# Patient Record
Sex: FEMALE | Race: WHITE | NOT HISPANIC OR LATINO | URBAN - METROPOLITAN AREA
[De-identification: names, ages, dates, MRNs, and addresses within clinical notes are randomized per-mention and may not be internally consistent; named-entity substitution may affect disease eponyms.]

---

## 2021-03-23 ENCOUNTER — IMMUNIZATION (OUTPATIENT)
Dept: NURSING | Facility: CLINIC | Age: 26
End: 2021-03-23
Payer: COMMERCIAL

## 2021-03-23 PROCEDURE — 0001A PR COVID VAC PFIZER DIL RECON 30 MCG/0.3 ML IM: CPT

## 2021-03-23 PROCEDURE — 91300 PR COVID VAC PFIZER DIL RECON 30 MCG/0.3 ML IM: CPT

## 2021-04-13 ENCOUNTER — IMMUNIZATION (OUTPATIENT)
Dept: NURSING | Facility: CLINIC | Age: 26
End: 2021-04-13
Attending: INTERNAL MEDICINE
Payer: COMMERCIAL

## 2021-04-13 PROCEDURE — 91300 PR COVID VAC PFIZER DIL RECON 30 MCG/0.3 ML IM: CPT

## 2021-04-13 PROCEDURE — 0002A PR COVID VAC PFIZER DIL RECON 30 MCG/0.3 ML IM: CPT

## 2021-05-08 ENCOUNTER — HEALTH MAINTENANCE LETTER (OUTPATIENT)
Age: 26
End: 2021-05-08

## 2021-10-23 ENCOUNTER — HEALTH MAINTENANCE LETTER (OUTPATIENT)
Age: 26
End: 2021-10-23

## 2022-06-04 ENCOUNTER — HEALTH MAINTENANCE LETTER (OUTPATIENT)
Age: 27
End: 2022-06-04

## 2022-10-10 ENCOUNTER — HEALTH MAINTENANCE LETTER (OUTPATIENT)
Age: 27
End: 2022-10-10

## 2023-06-10 ENCOUNTER — HEALTH MAINTENANCE LETTER (OUTPATIENT)
Age: 28
End: 2023-06-10

## 2024-05-08 ENCOUNTER — OFFICE VISIT (OUTPATIENT)
Dept: FAMILY MEDICINE | Facility: CLINIC | Age: 29
End: 2024-05-08
Payer: COMMERCIAL

## 2024-05-08 VITALS
OXYGEN SATURATION: 97 % | RESPIRATION RATE: 22 BRPM | BODY MASS INDEX: 50.5 KG/M2 | HEIGHT: 63 IN | DIASTOLIC BLOOD PRESSURE: 85 MMHG | SYSTOLIC BLOOD PRESSURE: 134 MMHG | TEMPERATURE: 97.5 F | WEIGHT: 285 LBS | HEART RATE: 125 BPM

## 2024-05-08 DIAGNOSIS — Z13.220 LIPID SCREENING: ICD-10-CM

## 2024-05-08 DIAGNOSIS — L65.9 HAIR LOSS: ICD-10-CM

## 2024-05-08 DIAGNOSIS — L68.0 HIRSUTISM: ICD-10-CM

## 2024-05-08 DIAGNOSIS — N94.6 DYSMENORRHEA: ICD-10-CM

## 2024-05-08 DIAGNOSIS — R53.83 OTHER FATIGUE: ICD-10-CM

## 2024-05-08 DIAGNOSIS — R06.83 SNORING: Primary | ICD-10-CM

## 2024-05-08 DIAGNOSIS — Z13.1 SCREENING FOR DIABETES MELLITUS: ICD-10-CM

## 2024-05-08 DIAGNOSIS — L70.9 ACNE, UNSPECIFIED ACNE TYPE: ICD-10-CM

## 2024-05-08 PROBLEM — F32.81 PMDD (PREMENSTRUAL DYSPHORIC DISORDER): Status: ACTIVE | Noted: 2024-05-08

## 2024-05-08 PROBLEM — F41.9 ANXIETY: Status: ACTIVE | Noted: 2024-05-08

## 2024-05-08 LAB
ERYTHROCYTE [DISTWIDTH] IN BLOOD BY AUTOMATED COUNT: 13.4 % (ref 10–15)
HBA1C MFR BLD: 5.8 % (ref 0–5.6)
HCT VFR BLD AUTO: 39.9 % (ref 35–47)
HGB BLD-MCNC: 13.2 G/DL (ref 11.7–15.7)
MCH RBC QN AUTO: 27.5 PG (ref 26.5–33)
MCHC RBC AUTO-ENTMCNC: 33.1 G/DL (ref 31.5–36.5)
MCV RBC AUTO: 83 FL (ref 78–100)
PLATELET # BLD AUTO: 291 10E3/UL (ref 150–450)
RBC # BLD AUTO: 4.8 10E6/UL (ref 3.8–5.2)
WBC # BLD AUTO: 8.3 10E3/UL (ref 4–11)

## 2024-05-08 PROCEDURE — 84443 ASSAY THYROID STIM HORMONE: CPT | Performed by: FAMILY MEDICINE

## 2024-05-08 PROCEDURE — 80053 COMPREHEN METABOLIC PANEL: CPT | Performed by: FAMILY MEDICINE

## 2024-05-08 PROCEDURE — 84270 ASSAY OF SEX HORMONE GLOBUL: CPT | Performed by: FAMILY MEDICINE

## 2024-05-08 PROCEDURE — 83036 HEMOGLOBIN GLYCOSYLATED A1C: CPT | Performed by: FAMILY MEDICINE

## 2024-05-08 PROCEDURE — 84403 ASSAY OF TOTAL TESTOSTERONE: CPT | Performed by: FAMILY MEDICINE

## 2024-05-08 PROCEDURE — 99204 OFFICE O/P NEW MOD 45 MIN: CPT | Performed by: FAMILY MEDICINE

## 2024-05-08 PROCEDURE — 83550 IRON BINDING TEST: CPT | Performed by: FAMILY MEDICINE

## 2024-05-08 PROCEDURE — 85027 COMPLETE CBC AUTOMATED: CPT | Performed by: FAMILY MEDICINE

## 2024-05-08 PROCEDURE — 80061 LIPID PANEL: CPT | Performed by: FAMILY MEDICINE

## 2024-05-08 PROCEDURE — 83540 ASSAY OF IRON: CPT | Performed by: FAMILY MEDICINE

## 2024-05-08 PROCEDURE — 82728 ASSAY OF FERRITIN: CPT | Performed by: FAMILY MEDICINE

## 2024-05-08 PROCEDURE — 82306 VITAMIN D 25 HYDROXY: CPT | Performed by: FAMILY MEDICINE

## 2024-05-08 PROCEDURE — 36415 COLL VENOUS BLD VENIPUNCTURE: CPT | Performed by: FAMILY MEDICINE

## 2024-05-08 RX ORDER — VILAZODONE HYDROCHLORIDE 40 MG/1
40 TABLET ORAL DAILY
COMMUNITY

## 2024-05-08 RX ORDER — VILAZODONE HYDROCHLORIDE 20 MG/1
20 TABLET ORAL DAILY
COMMUNITY

## 2024-05-08 RX ORDER — LIOTHYRONINE SODIUM 25 UG/1
25 TABLET ORAL DAILY
COMMUNITY

## 2024-05-08 NOTE — PROGRESS NOTES
"  Assessment & Plan     Snoring  - BANG score 3   - Adult Sleep Eval & Management  Referral; Future    Dysmenorrhea  - US Pelvic Complete with Transvaginal; Future    Acne, unspecified acne type  Hair loss  Hirsutism  - Pt concerned about PCOS, ordered below and US for further evaluation   - Testosterone Free and Total; Future  - Testosterone Free and Total    Other fatigue  - unclear etiology  - evaluate for anemia vs HAYDEN vs vitamin D deficiency vs thyroid etiology vs other   - ordered below for further evaluation; tx as indicated   - Vitamin D Deficiency; Future  - CBC with platelets; Future  - Comprehensive metabolic panel (BMP + Alb, Alk Phos, ALT, AST, Total. Bili, TP); Future  - TSH with free T4 reflex; Future  - Vitamin D Deficiency  - CBC with platelets  - Comprehensive metabolic panel (BMP + Alb, Alk Phos, ALT, AST, Total. Bili, TP)  - TSH with free T4 reflex    Screening for diabetes mellitus  - Hemoglobin A1c; Future  - Iron and iron binding capacity; Future  - Ferritin; Future  - Hemoglobin A1c  - Iron and iron binding capacity  - Ferritin    Lipid screening  - Lipid panel reflex to direct LDL Non-fasting; Future  - Lipid panel reflex to direct LDL Non-fasting            BMI  Estimated body mass index is 50.25 kg/m  as calculated from the following:    Height as of this encounter: 1.604 m (5' 3.15\").    Weight as of this encounter: 129.3 kg (285 lb).               Lesley Valentine is a 29 year old, presenting for the following health issues:  Consult      5/8/2024    11:17 AM   Additional Questions   Roomed by JENNA   Accompanied by self     History of Present Illness       Reason for visit:  Possible symptoms of PCOS, looking into weightloss medication for insulin resistance, ect. (Maybe look at sinuses if we have time).  Symptom onset:  More than a month  Symptoms include:  Weight gain, hirsutism, losing hair along part, exhaustion, insulin resistance, major PMDD - Can't breathe out of my " nose most of the time, horrible post-nasal drip, bad taste in mouth, blocked ears/tubes, tonsil stones, sneezing, swollen turbinates  Symptom intensity:  Moderate  Symptom progression:  Worsening  Had these symptoms before:  Yes  Has tried/received treatment for these symptoms:  No  What makes it worse:  Staying indoors and not doing anything, getting my period, lying down  What makes it better:  Getting outside, not having stress, ending my period, Flonase    She eats 2-3 servings of fruits and vegetables daily.She consumes 1 sweetened beverage(s) daily.She exercises with enough effort to increase her heart rate 9 or less minutes per day.  She exercises with enough effort to increase her heart rate 3 or less days per week.   She is taking medications regularly.     Works as a bee researcher.     She is concerned about hormonal changes. She has always struggled with weight since puberty and weight gain has gotten worse. Few years ago was on abilify and gained 30-50 lbs in one year but did go off that. With birth control and antidepressants has gained weight. She has suspected that she has PCOS. She had ovarian cyst when shew as younger. She has hair loss, acne and hair on her chest. She is on nexplanon and still gets periods which are unbearable.     She is currently seeing a psychiatrist.    She has never been on weight loss medications.   She has severe PMDD - feels suicidal, cries and horrible cramping. She passes huge blood clots. This is her second nexplanon, even with her first one she would get her menstrual cycles. Periods are heavy but short.     She takes cytomel to augment viibryd. No thyroid etiology.     She snores loudly and not sure about apnea.     She has severe social anxiety and gets racing heart when at medical office or outside. Normal heart rate at home and work.           Objective    LMP 04/29/2024   There is no height or weight on file to calculate BMI.  Physical Exam               Signed  Electronically by: Vaishali Benedict MD

## 2024-05-09 LAB
ALBUMIN SERPL BCG-MCNC: 4.3 G/DL (ref 3.5–5.2)
ALP SERPL-CCNC: 76 U/L (ref 40–150)
ALT SERPL W P-5'-P-CCNC: 9 U/L (ref 0–50)
ANION GAP SERPL CALCULATED.3IONS-SCNC: 10 MMOL/L (ref 7–15)
AST SERPL W P-5'-P-CCNC: 15 U/L (ref 0–45)
BILIRUB SERPL-MCNC: 0.4 MG/DL
BUN SERPL-MCNC: 11.4 MG/DL (ref 6–20)
CALCIUM SERPL-MCNC: 9.5 MG/DL (ref 8.6–10)
CHLORIDE SERPL-SCNC: 104 MMOL/L (ref 98–107)
CHOLEST SERPL-MCNC: 220 MG/DL
CREAT SERPL-MCNC: 0.67 MG/DL (ref 0.51–0.95)
DEPRECATED HCO3 PLAS-SCNC: 24 MMOL/L (ref 22–29)
EGFRCR SERPLBLD CKD-EPI 2021: >90 ML/MIN/1.73M2
FASTING STATUS PATIENT QL REPORTED: NO
FASTING STATUS PATIENT QL REPORTED: NO
FERRITIN SERPL-MCNC: 49 NG/ML (ref 6–175)
GLUCOSE SERPL-MCNC: 154 MG/DL (ref 70–99)
HDLC SERPL-MCNC: 50 MG/DL
IRON BINDING CAPACITY (ROCHE): 292 UG/DL (ref 240–430)
IRON SATN MFR SERPL: 20 % (ref 15–46)
IRON SERPL-MCNC: 59 UG/DL (ref 37–145)
LDLC SERPL CALC-MCNC: 132 MG/DL
NONHDLC SERPL-MCNC: 170 MG/DL
POTASSIUM SERPL-SCNC: 4.1 MMOL/L (ref 3.4–5.3)
PROT SERPL-MCNC: 7.6 G/DL (ref 6.4–8.3)
SHBG SERPL-SCNC: 26 NMOL/L (ref 30–135)
SODIUM SERPL-SCNC: 138 MMOL/L (ref 135–145)
TRIGL SERPL-MCNC: 188 MG/DL
TSH SERPL DL<=0.005 MIU/L-ACNC: 0.57 UIU/ML (ref 0.3–4.2)
VIT D+METAB SERPL-MCNC: 24 NG/ML (ref 20–50)

## 2024-05-10 LAB
TESTOST FREE SERPL-MCNC: 0.68 NG/DL
TESTOST SERPL-MCNC: 33 NG/DL (ref 8–60)

## 2024-05-28 ASSESSMENT — PATIENT HEALTH QUESTIONNAIRE - PHQ9
10. IF YOU CHECKED OFF ANY PROBLEMS, HOW DIFFICULT HAVE THESE PROBLEMS MADE IT FOR YOU TO DO YOUR WORK, TAKE CARE OF THINGS AT HOME, OR GET ALONG WITH OTHER PEOPLE: SOMEWHAT DIFFICULT
SUM OF ALL RESPONSES TO PHQ QUESTIONS 1-9: 7
SUM OF ALL RESPONSES TO PHQ QUESTIONS 1-9: 7

## 2024-05-29 ENCOUNTER — OFFICE VISIT (OUTPATIENT)
Dept: FAMILY MEDICINE | Facility: CLINIC | Age: 29
End: 2024-05-29
Payer: COMMERCIAL

## 2024-05-29 VITALS
DIASTOLIC BLOOD PRESSURE: 82 MMHG | WEIGHT: 284.7 LBS | SYSTOLIC BLOOD PRESSURE: 126 MMHG | HEART RATE: 101 BPM | OXYGEN SATURATION: 98 % | BODY MASS INDEX: 50.45 KG/M2 | RESPIRATION RATE: 20 BRPM | HEIGHT: 63 IN | TEMPERATURE: 97.2 F

## 2024-05-29 DIAGNOSIS — E66.813 CLASS 3 SEVERE OBESITY WITH SERIOUS COMORBIDITY AND BODY MASS INDEX (BMI) OF 50.0 TO 59.9 IN ADULT, UNSPECIFIED OBESITY TYPE (H): ICD-10-CM

## 2024-05-29 DIAGNOSIS — R89.9 ABNORMAL LABORATORY TEST RESULT: ICD-10-CM

## 2024-05-29 DIAGNOSIS — E55.9 AVITAMINOSIS D: Primary | ICD-10-CM

## 2024-05-29 DIAGNOSIS — R73.03 PREDIABETES: ICD-10-CM

## 2024-05-29 DIAGNOSIS — J34.2 DEVIATED NASAL SEPTUM: ICD-10-CM

## 2024-05-29 DIAGNOSIS — E66.01 CLASS 3 SEVERE OBESITY WITH SERIOUS COMORBIDITY AND BODY MASS INDEX (BMI) OF 50.0 TO 59.9 IN ADULT, UNSPECIFIED OBESITY TYPE (H): ICD-10-CM

## 2024-05-29 DIAGNOSIS — Z87.09 HISTORY OF ENLARGED TONSILS: ICD-10-CM

## 2024-05-29 PROCEDURE — 99214 OFFICE O/P EST MOD 30 MIN: CPT | Performed by: FAMILY MEDICINE

## 2024-05-29 RX ORDER — ERGOCALCIFEROL 1.25 MG/1
50000 CAPSULE, LIQUID FILLED ORAL WEEKLY
Qty: 12 CAPSULE | Refills: 0 | Status: SHIPPED | OUTPATIENT
Start: 2024-05-29 | End: 2024-08-15

## 2024-05-29 NOTE — PROGRESS NOTES
"  Assessment & Plan     Avitaminosis D  - advised to start below, recheck  labs in three months   - vitamin D2 (ERGOCALCIFEROL) 13243 units (1250 mcg) capsule; Take 1 capsule (50,000 Units) by mouth once a week for 12 doses  - PRIMARY CARE FOLLOW-UP SCHEDULING; Future    Class 3 severe obesity with serious comorbidity and body mass index (BMI) of 50.0 to 59.9 in adult, unspecified obesity type (H)  - Med Therapy Management Referral    Prediabetes  - discussed lifestyle changes, follow up in three months   - PRIMARY CARE FOLLOW-UP SCHEDULING; Future    Deviated nasal septum  - Adult ENT  Referral; Future    History of enlarged tonsils  - Adult ENT  Referral; Future    Abnormal laboratory test result  - discussed low sex hormone binding globulin, normal testosterone levels. Discussed lifestyle changes and recheck labs in three months. Pelvic US scheduled.           BMI  Estimated body mass index is 50.19 kg/m  as calculated from the following:    Height as of this encounter: 1.604 m (5' 3.15\").    Weight as of this encounter: 129.1 kg (284 lb 11.2 oz).         Patient Instructions   Iron - increasing iron rich foods including green leafy vegetables, beans and lentils    Cholesterol - low fat diet   Here are some ways to improve your nutrition (adapted from the American Academy of Family [employer] handout):  Eat less fat (especially butter, Crisco and other saturated fats)  Buy lean cuts of meat; reduce your portions of red meat or substitute poultry or fish  Use skim milk and low-fat dairy products  Eat no more than 4 egg yolks per week  Avoid fried or fast foods that are high in fat  Eat more fruits and vegetables      Vitamin D - vitamin D 50, 000 U once per week for twelve weeks    Prediabetes - low carbohydrate diet     Allergies - you can try over the counter allegra, Claritin or zyrtec. Continue Flonase.       Lesley Valentine is a 29 year old, presenting for the following health " issues:  RECHECK      5/29/2024     2:03 PM   Additional Questions   Roomed by MILTON MUKHERJEE     History of Present Illness       Reason for visit:  Follow up about general exam on 5/8/24. Weight and sinuses need discussing.    She eats 2-3 servings of fruits and vegetables daily.She consumes 1 sweetened beverage(s) daily.She exercises with enough effort to increase her heart rate 9 or less minutes per day.  She exercises with enough effort to increase her heart rate 3 or less days per week.   She is taking medications regularly.       Overweight since age 6. She went through puberty at that age. She was also on antidepressants. Over the years she has tried exercise and diet. It takes so much for her to loose even a few pounds. Around 2020 she was put on Abilify, once she started taking abilify she put on 50-60 lbs over the course of one year. Since then she hasn't been able to loose weight. She doesn't feel that she eats unhealthy at all. She loves vegetables and tofu. She does like healthy food. She has been on birth control since 2017 and even harder to loose weight. She has not been seen by weight loss clinic or prescribed weight loss medications.     She has always had problems with sinuses. She has had ear tubes and adenoids removed. Over the past few years she has felt that her sinuses and allergies have gotten really bad. She always has post nasal drip and worse at night when laying down. Sometimes she feels that she is chocking on it when she sleeps. She gets horrible tonsil stones, foods can get stuck in tonsils and enlarged tonsils. Eustachian tube always feel blocked. Some pressure in palate. She has a hard time breathing through her nose a lot. No ENT evaluation since she was 4 years old. She is currently taking Flonase at night. Allergies symptom include itchy eyes, nasal discharge, post nasal drip and white coating in tongue. OTC allergy medication hasn't helped.           Objective    /82 (BP  "Location: Right arm, Patient Position: Sitting, Cuff Size: Adult Large)   Pulse 101   Temp 97.2  F (36.2  C) (Temporal)   Resp 20   Ht 1.604 m (5' 3.15\")   Wt 129.1 kg (284 lb 11.2 oz)   LMP 04/29/2024   SpO2 98%   BMI 50.19 kg/m    Body mass index is 50.19 kg/m .  Physical Exam               Signed Electronically by: Vaishali Benedict MD    "

## 2024-05-29 NOTE — PATIENT INSTRUCTIONS
Iron - increasing iron rich foods including green leafy vegetables, beans and lentils    Cholesterol - low fat diet   Here are some ways to improve your nutrition (adapted from the American Academy of Family Practice handout):  Eat less fat (especially butter, Crisco and other saturated fats)  Buy lean cuts of meat; reduce your portions of red meat or substitute poultry or fish  Use skim milk and low-fat dairy products  Eat no more than 4 egg yolks per week  Avoid fried or fast foods that are high in fat  Eat more fruits and vegetables      Vitamin D - vitamin D 50, 000 U once per week for twelve weeks    Prediabetes - low carbohydrate diet     Allergies - you can try over the counter allegra, Claritin or zyrtec. Continue Flonase.

## 2024-06-23 NOTE — PROGRESS NOTES
Medication Therapy Management (MTM) Encounter    ASSESSMENT:                            Medication Adherence/Access: No issues identified    Class III Obesity (BMI 40 or more):   Patient would benefit from  :start wegovy 0.25mg./week dose -- mtm will submit PA today   and lifestyle modifications. See plan for details.       PLAN:                            1. Clinic weight today 289.6lbs--lets try for prior authorization for Wegovy 0.25mg,./week -- watch your cell phone for yes/no answer- message me via [employer] when you get an answer.     Remember potential side effects are nausea/vomiting --only eat when hungry , eat protein first then a little carbohydrate , stop eating when feeling full, if constipation occurs --drink glass of miralax /day . Let me know if your need an anti-nausea or anti-constipation medication.     Walk daily for 30-60 minutes for exercise if you can .         Follow-up: Return in about 29 days (around 7/23/2024), or @ 3 PM, for Medication Therapy Management Visit, Weight loss.    SUBJECTIVE/OBJECTIVE:                          Meme Gramajo is a 29 year old female seen for an initial visit. She was referred to me from Dr. Vaishali Benedict.  .      Reason for visit:   weight managment     Allergies/ADRs: Reviewed in chart  Past Medical History: Reviewed in chart  Tobacco: She reports that she has never smoked. She has never used smokeless tobacco.  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   Personal Healthcare Goals: wt.loss goal = lose weight and fell better , more active.   Activity: not hardly at all--tiny dog --2 blocks.       Medication Adherence/Access: no issues reported    Weight Management   Never tried any wt.loss meds.     Nutrition/Eating Habits: tried dieting in the past many times --nothing has ever worked.  Bfast- coffee and oat milk, cereal or yogurt or bagels.  "  Lunch ; small meals -pea pods, dried fruit , eggs   Dinner; cooks at home - tonight pasta with shrimp and garlic bread  Late night snacks --loves ice cream --1/2 cup after dinner 3 x week.      Exercise/Activity: sedentary   Medication History:  None.      Wt Readings from Last 4 Encounters:   06/24/24 289 lb 9.6 oz (131.4 kg)   05/29/24 284 lb 11.2 oz (129.1 kg)   05/08/24 285 lb (129.3 kg)     Estimated body mass index is 51.06 kg/m  as calculated from the following:    Height as of 5/29/24: 5' 3.15\" (1.604 m).    Weight as of this encounter: 289 lb 9.6 oz (131.4 kg).    Lab Results   Component Value Date    A1C 5.8 05/08/2024       Today's Vitals: BP (!) 126/90   Pulse 101   Wt 289 lb 9.6 oz (131.4 kg)   LMP 04/29/2024   SpO2 97%   BMI 51.06 kg/m    ----------------      I spent 30 minutes with this patient today. All changes were made via collaborative practice agreement with Physician No Ref-Primary. A copy of the visit note was provided to the patient's provider(s).    A summary of these recommendations was given to the patient.    Aramis Espinoza tesfaye.  Medication Therapy Management Provider  [phone]         Medication Therapy Recommendations  Morbid obesity (H)    Rationale: Untreated condition - Needs additional medication therapy - Indication   Recommendation: Start Medication - Wegovy 0.25 MG/0.5ML Soaj   Status: Accepted per CPA   Note: PA submitted today will await ins. reply.              "

## 2024-06-24 ENCOUNTER — OFFICE VISIT (OUTPATIENT)
Dept: PHARMACY | Facility: CLINIC | Age: 29
End: 2024-06-24
Attending: FAMILY MEDICINE
Payer: COMMERCIAL

## 2024-06-24 VITALS
SYSTOLIC BLOOD PRESSURE: 126 MMHG | WEIGHT: 289.6 LBS | DIASTOLIC BLOOD PRESSURE: 90 MMHG | BODY MASS INDEX: 51.06 KG/M2 | HEART RATE: 101 BPM | OXYGEN SATURATION: 97 %

## 2024-06-24 DIAGNOSIS — E66.01 MORBID OBESITY (H): Primary | ICD-10-CM

## 2024-06-24 PROCEDURE — 99605 MTMS BY PHARM NP 15 MIN: CPT | Performed by: PHARMACIST

## 2024-06-24 PROCEDURE — 99607 MTMS BY PHARM ADDL 15 MIN: CPT | Performed by: PHARMACIST

## 2024-06-24 NOTE — Clinical Note
Vaishali--darrylx for mtm referral--I submitted pa today for wegovy --lets see what happens ...-Aramis

## 2024-06-24 NOTE — PATIENT INSTRUCTIONS
"Recommendations from today's MTM visit:                                                    MTM (medication therapy management) is a service provided by a clinical pharmacist designed to help you get the most of out of your medicines.   Today we reviewed what your medicines are for, how to know if they are working, that your medicines are safe and how to make your medicine regimen as easy as possible.      1. Clinic weight today 289.6lbs--lets try for prior authorization for Wegovy 0.25mg,./week -- watch your cell phone for yes/no answer- message me via [employer] when you get an answer.     Remember potential side effects are nausea/vomiting --only eat when hungry , eat protein first then a little carbohydrate , stop eating when feeling full, if constipation occurs --drink glass of miralax /day . Let me know if your need an anti-nausea or anti-constipation medication.     Walk daily for 30-60 minutes for exercise if you can .         Follow-up: Return in about 29 days (around 7/23/2024), or @ 3 PM, for Medication Therapy Management Visit, Weight loss.    It was great speaking with you today.  I value your experience and would be very thankful for your time in providing feedback in our clinic survey. In the next few days, you may receive an email or text message from [employer] with a link to a survey related to your  clinical pharmacist.\"     To schedule another MTM appointment, please call the clinic directly or you may call the MTM scheduling line at [phone] or toll-free at [phone].     My Clinical Pharmacist's contact information:                                                      Please feel free to contact me with any questions or concerns you have.      Aramis Espinoza Rph.  Medication Therapy Management Provider  [phone]    "

## 2024-08-03 ENCOUNTER — HEALTH MAINTENANCE LETTER (OUTPATIENT)
Age: 29
End: 2024-08-03

## 2024-08-05 ENCOUNTER — OFFICE VISIT (OUTPATIENT)
Dept: PHARMACY | Facility: CLINIC | Age: 29
End: 2024-08-05
Payer: COMMERCIAL

## 2024-08-05 VITALS
BODY MASS INDEX: 49.91 KG/M2 | WEIGHT: 283.1 LBS | HEART RATE: 115 BPM | OXYGEN SATURATION: 96 % | DIASTOLIC BLOOD PRESSURE: 86 MMHG | SYSTOLIC BLOOD PRESSURE: 134 MMHG

## 2024-08-05 DIAGNOSIS — R11.0 NAUSEA: ICD-10-CM

## 2024-08-05 DIAGNOSIS — E66.01 MORBID OBESITY (H): Primary | ICD-10-CM

## 2024-08-05 PROCEDURE — 99606 MTMS BY PHARM EST 15 MIN: CPT | Performed by: PHARMACIST

## 2024-08-05 PROCEDURE — 99607 MTMS BY PHARM ADDL 15 MIN: CPT | Performed by: PHARMACIST

## 2024-08-05 RX ORDER — ONDANSETRON 4 MG/1
4 TABLET, FILM COATED ORAL EVERY 8 HOURS PRN
Qty: 30 TABLET | Refills: 1 | Status: SHIPPED | OUTPATIENT
Start: 2024-08-05

## 2024-08-05 NOTE — PROGRESS NOTES
Medication Therapy Management (MTM) Encounter    ASSESSMENT:                            Medication Adherence/Access: No issues identified    Class III Obesity (BMI 40 or more):   Patient would benefit from  :Increase weekly dose Wegovy 1.0mg./week dose   and lifestyle modifications. See plan for details.       PLAN:                            1. Clinic weight - 283.1lbs-- down from 289.6lbs on 6-24-24--wegovy working well- lets increase your weekly dose after you finish last 2 doses of 0.5mg. --then try the 1mg./week wegovy .       Remember potential side effects are nausea/vomiting --only eat when hungry , eat protein first then a little carbohydrate , stop eating when feeling full, if constipation occurs --drink glass of miralax /day . Let me know if your need an anti-nausea or anti-constipation medication.     Walk daily for 30-60 minutes for exercise if you can .     Ill order you an anti-nausea drug to have on hand.  Consider pre-treat with 1 pill 2-3 hours before each injection, then if needed post treat afterwards as well.       Follow-up: Return in about 5 weeks (around 9/10/2024), or @ 1:30 PM, for Weight loss, Medication Therapy Management Visit, BP Recheck.      SUBJECTIVE/OBJECTIVE:                          Meme Gramajo is a 29 year old female seen for a follow-up (6-24-24) visit. She was referred to me from Dr. Vaishali Benedict.  .      Reason for visit:   weight managment     Allergies/ADRs: Reviewed in chart  Past Medical History: Reviewed in chart  Tobacco: She reports that she has never smoked. She has never used smokeless tobacco.  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   Personal Healthcare Goals: wt.loss goal = lose weight and fell better , more active.   Activity: not hardly at all--tiny dog --2 blocks.       Medication Adherence/Access: no issues reported    Weight Management  "  Wegovy 0.25mg./week x 4 weeks- going well--now 0.5mg./week- did get ill on first dose --second dose last Saturday --tolerates well now.   Eats proteins first but struggles with eating other sources of proteins other than meats --like greek yogurt.   Struggles to get exercise - feels new job end of August will help.     Previously :   Never tried any wt.loss meds.     Nutrition/Eating Habits: tried dieting in the past many times --nothing has ever worked.  Bfast- coffee and oat milk, cereal or yogurt or bagels.   Lunch ; small meals -pea pods, dried fruit , eggs   Dinner; cooks at home - tonight pasta with shrimp and garlic bread  Late night snacks --loves ice cream --1/2 cup after dinner 3 x week.      Exercise/Activity: sedentary   Medication History:  None.      Wt Readings from Last 4 Encounters:   08/05/24 283 lb 1.6 oz (128.4 kg)   06/24/24 289 lb 9.6 oz (131.4 kg)   05/29/24 284 lb 11.2 oz (129.1 kg)   05/08/24 285 lb (129.3 kg)     Estimated body mass index is 49.91 kg/m  as calculated from the following:    Height as of 5/29/24: 5' 3.15\" (1.604 m).    Weight as of this encounter: 283 lb 1.6 oz (128.4 kg).    Lab Results   Component Value Date    A1C 5.8 05/08/2024       Today's Vitals: /86   Pulse 115   Wt 283 lb 1.6 oz (128.4 kg)   SpO2 96%   BMI 49.91 kg/m    ----------------      I spent 30 minutes with this patient today. All changes were made via collaborative practice agreement with Physician No Ref-Primary. A copy of the visit note was provided to the patient's provider(s).    A summary of these recommendations was given to the patient.    Aramis Espinoza Rph.  Medication Therapy Management Provider  [phone]         Medication Therapy Recommendations  Morbid obesity (H)    Current Medication: Semaglutide-Weight Management (WEGOVY) 0.5 MG/0.5ML pen (Discontinued)   Rationale: Dose too low - Dosage too low - Effectiveness   Recommendation: Increase Dose - Wegovy 1 MG/0.5ML Soaj "   Status: Accepted per CPA   Note: + anti nausea med zofran to have on hand for wegovy dose increase.

## 2024-08-05 NOTE — PATIENT INSTRUCTIONS
"Recommendations from today's MTM visit:                                                         1. Clinic weight - 283.1lbs-- down from 289.6lbs on 6-24-24--wegovy working well- lets increase your weekly dose after you finish last 2 doses of 0.5mg. --then try the 1mg./week wegovy .       Remember potential side effects are nausea/vomiting --only eat when hungry , eat protein first then a little carbohydrate , stop eating when feeling full, if constipation occurs --drink glass of miralax /day . Let me know if your need an anti-nausea or anti-constipation medication.     Walk daily for 30-60 minutes for exercise if you can .     Ill order you an anti-nausea drug to have on hand.  Consider pre-treat with 1 pill 2-3 hours before each injection, then if needed post treat afterwards as well.       Follow-up: Return in about 5 weeks (around 9/10/2024), or @ 1:30 PM, for Weight loss, Medication Therapy Management Visit, BP Recheck.    It was great speaking with you today.  I value your experience and would be very thankful for your time in providing feedback in our clinic survey. In the next few days, you may receive an email or text message from [employer] with a link to a survey related to your  clinical pharmacist.\"     To schedule another MTM appointment, please call the clinic directly or you may call the MTM scheduling line at [phone] or toll-free at [phone].     My Clinical Pharmacist's contact information:                                                      Please feel free to contact me with any questions or concerns you have.      Aramis Espinoza Rph.  Medication Therapy Management Provider  [phone]    "

## 2024-08-05 NOTE — Clinical Note
Vaishali--renea off to good start with wegovy --I did ioncrease her dose to 1mg./week now --recheck in 6 weeks -brenna

## 2024-08-30 ENCOUNTER — OFFICE VISIT (OUTPATIENT)
Dept: FAMILY MEDICINE | Facility: CLINIC | Age: 29
End: 2024-08-30
Attending: FAMILY MEDICINE
Payer: COMMERCIAL

## 2024-08-30 VITALS
OXYGEN SATURATION: 96 % | HEIGHT: 64 IN | SYSTOLIC BLOOD PRESSURE: 121 MMHG | WEIGHT: 281 LBS | HEART RATE: 110 BPM | TEMPERATURE: 97.6 F | RESPIRATION RATE: 16 BRPM | DIASTOLIC BLOOD PRESSURE: 86 MMHG | BODY MASS INDEX: 47.97 KG/M2

## 2024-08-30 DIAGNOSIS — E55.9 AVITAMINOSIS D: Primary | ICD-10-CM

## 2024-08-30 DIAGNOSIS — R73.03 PREDIABETES: ICD-10-CM

## 2024-08-30 DIAGNOSIS — E78.2 MIXED HYPERLIPIDEMIA: ICD-10-CM

## 2024-08-30 DIAGNOSIS — Z12.4 CERVICAL CANCER SCREENING: ICD-10-CM

## 2024-08-30 LAB
CHOLEST SERPL-MCNC: 181 MG/DL
FASTING STATUS PATIENT QL REPORTED: NO
HBA1C MFR BLD: 5.3 % (ref 0–5.6)
HDLC SERPL-MCNC: 45 MG/DL
LDLC SERPL CALC-MCNC: 111 MG/DL
NONHDLC SERPL-MCNC: 136 MG/DL
TRIGL SERPL-MCNC: 126 MG/DL
VIT D+METAB SERPL-MCNC: 39 NG/ML (ref 20–50)

## 2024-08-30 PROCEDURE — 99214 OFFICE O/P EST MOD 30 MIN: CPT | Mod: 25 | Performed by: FAMILY MEDICINE

## 2024-08-30 PROCEDURE — 80061 LIPID PANEL: CPT | Performed by: FAMILY MEDICINE

## 2024-08-30 PROCEDURE — 90715 TDAP VACCINE 7 YRS/> IM: CPT | Performed by: FAMILY MEDICINE

## 2024-08-30 PROCEDURE — 90471 IMMUNIZATION ADMIN: CPT | Performed by: FAMILY MEDICINE

## 2024-08-30 PROCEDURE — 83036 HEMOGLOBIN GLYCOSYLATED A1C: CPT | Performed by: FAMILY MEDICINE

## 2024-08-30 PROCEDURE — 36415 COLL VENOUS BLD VENIPUNCTURE: CPT | Performed by: FAMILY MEDICINE

## 2024-08-30 PROCEDURE — 82306 VITAMIN D 25 HYDROXY: CPT | Performed by: FAMILY MEDICINE

## 2024-08-30 ASSESSMENT — PAIN SCALES - GENERAL: PAINLEVEL: NO PAIN (0)

## 2024-08-30 ASSESSMENT — PATIENT HEALTH QUESTIONNAIRE - PHQ9: SUM OF ALL RESPONSES TO PHQ QUESTIONS 1-9: 6

## 2024-08-30 NOTE — PROGRESS NOTES
"  Assessment & Plan     Avitaminosis D  - advised below   Vitamin D3 10, 000 U ( two X 5, 000 U)   - PRIMARY CARE FOLLOW-UP SCHEDULING  - Vitamin D Deficiency; Future    Prediabetes  - PRIMARY CARE FOLLOW-UP SCHEDULING  - Hemoglobin A1c; Future    Mixed hyperlipidemia  - Lipid panel reflex to direct LDL Non-fasting; Future    Cervical cancer screening  - will schedule pap smear/physical      The longitudinal plan of care for the diagnosis(es)/condition(s) as documented were addressed during this visit. Due to the added complexity in care, I will continue to support Meme in the subsequent management and with ongoing continuity of care.       BMI  Estimated body mass index is 49 kg/m  as calculated from the following:    Height as of this encounter: 1.613 m (5' 3.5\").    Weight as of this encounter: 127.5 kg (281 lb).             Subjective   Meme is a 29 year old, presenting for the following health issues:  RECHECK        8/30/2024     2:19 PM   Additional Questions   Roomed by Gloria CALVERT     History of Present Illness       Reason for visit:  Re-check on vitamin D levels and also just a general follow-up.    She eats 2-3 servings of fruits and vegetables daily.She consumes 2 sweetened beverage(s) daily.She exercises with enough effort to increase her heart rate 9 or less minutes per day.  She exercises with enough effort to increase her heart rate 3 or less days per week.   She is taking medications regularly.     Down 11 lbs over the last 10 weeks. She gets really nauseous when she goes up a dose.     She still has fatigue and vitamin D didn't help.           Objective    /86   Pulse 110   Temp 97.6  F (36.4  C) (Temporal)   Resp 16   Ht 1.613 m (5' 3.5\")   Wt 127.5 kg (281 lb)   LMP 08/14/2024 (Exact Date)   SpO2 96%   BMI 49.00 kg/m    Body mass index is 49 kg/m .  Physical Exam               Signed Electronically by: Vaishali Benedict MD    "

## 2024-08-30 NOTE — NURSING NOTE
Prior to immunization administration, verified patients identity using patient s name and date of birth. Please see Immunization Activity for additional information.     Screening Questionnaire for Adult Immunization    Are you sick today?   No   Do you have allergies to medications, food, a vaccine component or latex?   No   Have you ever had a serious reaction after receiving a vaccination?   No   Do you have a long-term health problem with heart, lung, kidney, or metabolic disease (e.g., diabetes), asthma, a blood disorder, no spleen, complement component deficiency, a cochlear implant, or a spinal fluid leak?  Are you on long-term aspirin therapy?   No   Do you have cancer, leukemia, HIV/AIDS, or any other immune system problem?   No   Do you have a parent, brother, or sister with an immune system problem?   No   In the past 3 months, have you taken medications that affect  your immune system, such as prednisone, other steroids, or anticancer drugs; drugs for the treatment of rheumatoid arthritis, Crohn s disease, or psoriasis; or have you had radiation treatments?   No   Have you had a seizure, or a brain or other nervous system problem?   No   During the past year, have you received a transfusion of blood or blood    products, or been given immune (gamma) globulin or antiviral drug?   No   For women: Are you pregnant or is there a chance you could become       pregnant during the next month?   No   Have you received any vaccinations in the past 4 weeks?   No     Immunization questionnaire answers were all negative.      Patient instructed to remain in clinic for 15 minutes afterwards, and to report any adverse reactions.     Screening performed by Jose Toribio MA on 8/30/2024 at 3:14 PM.

## 2024-09-08 NOTE — PROGRESS NOTES
Medication Therapy Management (MTM) Encounter    ASSESSMENT:                            Medication Adherence/Access: No issues identified    Class III Obesity (BMI 40 or more):   Patient would benefit from  :Increase weekly dose Wegovy 1.7mg./week dose   and lifestyle modifications. See plan for details.       PLAN:                            1. Clinic weight today = 277.7lbs-- down 12 lbs since 6-24-24--excellent !  Lets increase your Wegovy 1.7mg./week --ok to try anti nausea med 2-3 hours before weekly injection --inject at bedtime , if following morning nausea there --take another pill.    Remember potential side effects are nausea/vomiting --only eat when hungry , eat protein first then a little carbohydrate , stop eating when feeling full, if constipation occurs --drink glass of miralax /day .   Walk daily for 30-60 minutes with your dog-kt for exercise if you can .     Strongly consider joining [employer] --do 1-2 x week strength training --[employer] machines.         Follow-up: Return in about 9 weeks (around 11/12/2024), or @ 1;30 PM, for Medication Therapy Management Visit, Weight loss.      SUBJECTIVE/OBJECTIVE:                          Meme Gramajo is a 29 year old female seen for a follow-up (8-5-24) visit. She was referred to me from Dr. Vaishali Benedict.  .      Reason for visit:   weight managment     Allergies/ADRs: Reviewed in chart  Past Medical History: Reviewed in chart  Tobacco: She reports that she has never smoked. She has never used smokeless tobacco.  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   Personal Healthcare Goals: wt.loss goal = lose weight and fell better , more active.   Activity: not hardly at all--tiny dog --2 blocks.       Medication Adherence/Access: no issues reported    Weight Management   Wegovy 1mg./week- working well lost another 6 lbs.  Mild side effects now.   "No BM issues.  This is the first dose that stops her food noise and gets full very fast .     Eating protein first , [place] [facility] but not [facility] .     [place] dog daily.     Wegovy 0.25mg./week x 4 weeks- going well--now 0.5mg./week- did get ill on first dose --second dose last Saturday --tolerates well now.   Eats proteins first but struggles with eating other sources of proteins other than meats --like greek yogurt.   Struggles to get exercise - feels new job end of August will help.     Previously :   Never tried any wt.loss meds.     Nutrition/Eating Habits: tried dieting in the past many times --nothing has ever worked.  Bfast- coffee and oat milk, cereal or yogurt or bagels.   Lunch ; small meals -pea pods, dried fruit , eggs   Dinner; cooks at home - tonight pasta with shrimp and garlic bread  Late night snacks --loves ice cream --1/2 cup after dinner 3 x week.      Exercise/Activity: sedentary   Medication History:  None.      Wt Readings from Last 4 Encounters:   09/10/24 277 lb 11.2 oz (126 kg)   08/30/24 281 lb (127.5 kg)   08/05/24 283 lb 1.6 oz (128.4 kg)   06/24/24 289 lb 9.6 oz (131.4 kg)     Estimated body mass index is 48.42 kg/m  as calculated from the following:    Height as of 8/30/24: 5' 3.5\" (1.613 m).    Weight as of this encounter: 277 lb 11.2 oz (126 kg).    Lab Results   Component Value Date    A1C 5.3 08/30/2024    A1C 5.8 05/08/2024           Today's Vitals: /85   Pulse 96   Wt 277 lb 11.2 oz (126 kg)   LMP 08/14/2024 (Exact Date)   SpO2 96%   BMI 48.42 kg/m    ----------------      I spent 15 minutes with this patient today. All changes were made via collaborative practice agreement with Dr. Vaishali Benedict MD. A copy of the visit note was provided to the patient's provider(s).    A summary of these recommendations was given to the patient.    Aramis Espinoza Rph.  Medication Therapy Management Provider  [phone]         Medication Therapy Recommendations  Morbid obesity (H) "    Current Medication: Semaglutide-Weight Management (WEGOVY) 1 MG/0.5ML pen (Discontinued)   Rationale: Dose too low - Dosage too low - Effectiveness   Recommendation: Increase Dose - Wegovy 1.7 MG/0.75ML Soaj   Status: Accepted per CPA

## 2024-09-10 ENCOUNTER — OFFICE VISIT (OUTPATIENT)
Dept: PHARMACY | Facility: CLINIC | Age: 29
End: 2024-09-10
Payer: COMMERCIAL

## 2024-09-10 VITALS
DIASTOLIC BLOOD PRESSURE: 85 MMHG | SYSTOLIC BLOOD PRESSURE: 117 MMHG | BODY MASS INDEX: 48.42 KG/M2 | OXYGEN SATURATION: 96 % | WEIGHT: 277.7 LBS | HEART RATE: 96 BPM

## 2024-09-10 DIAGNOSIS — E66.01 MORBID OBESITY (H): Primary | ICD-10-CM

## 2024-09-10 PROCEDURE — 99606 MTMS BY PHARM EST 15 MIN: CPT | Performed by: PHARMACIST

## 2024-09-10 NOTE — PATIENT INSTRUCTIONS
"Recommendations from today's MTM visit:                                                         1. Clinic weight today = 277.7lbs-- down 12 lbs since 6-24-24--excellent !  Lets increase your Wegovy 1.7mg./week --ok to try anti nausea med 2-3 hours before weekly injection --inject at bedtime , if following morning nausea there --take another pill.    Remember potential side effects are nausea/vomiting --only eat when hungry , eat protein first then a little carbohydrate , stop eating when feeling full, if constipation occurs --drink glass of miralax /day .   Walk daily for 30-60 minutes with your dog-kt for exercise if you can .     Strongly consider joining [employer] --do 1-2 x week strength training --[employer]ilus machines.         Follow-up: Return in about 9 weeks (around 11/12/2024), or @ 1;30 PM, for Medication Therapy Management Visit, Weight loss.    It was great speaking with you today.  I value your experience and would be very thankful for your time in providing feedback in our clinic survey. In the next few days, you may receive an email or text message from [employer] with a link to a survey related to your  clinical pharmacist.\"     To schedule another MTM appointment, please call the clinic directly or you may call the MTM scheduling line at [phone] or toll-free at [phone].     My Clinical Pharmacist's contact information:                                                      Please feel free to contact me with any questions or concerns you have.      Aramis Espinoza Rph.  Medication Therapy Management Provider  [phone]    "

## 2024-11-04 ENCOUNTER — MYC MEDICAL ADVICE (OUTPATIENT)
Dept: PHARMACY | Facility: CLINIC | Age: 29
End: 2024-11-04
Payer: COMMERCIAL

## 2024-11-04 NOTE — TELEPHONE ENCOUNTER
Dmiitris Self, I unexpectedly have a family  to attend on  when my scheduled appointment time is. Is there any other time on any other day you could see me this week (week of ) or that next week? If I need to call the scheduling people I will. Hope you are doing well!  Meme Webb will reschedule her to 24 at 4;30pm.    Aramis Espinoza [facility].  Medication Therapy Management Provider  [phone]

## 2024-11-07 ENCOUNTER — OFFICE VISIT (OUTPATIENT)
Dept: PHARMACY | Facility: CLINIC | Age: 29
End: 2024-11-07
Payer: COMMERCIAL

## 2024-11-07 VITALS
SYSTOLIC BLOOD PRESSURE: 132 MMHG | BODY MASS INDEX: 46.62 KG/M2 | OXYGEN SATURATION: 98 % | HEART RATE: 111 BPM | DIASTOLIC BLOOD PRESSURE: 78 MMHG | WEIGHT: 267.4 LBS

## 2024-11-07 DIAGNOSIS — E66.01 MORBID OBESITY (H): ICD-10-CM

## 2024-11-07 PROCEDURE — 99606 MTMS BY PHARM EST 15 MIN: CPT | Performed by: PHARMACIST

## 2024-11-07 PROCEDURE — 99607 MTMS BY PHARM ADDL 15 MIN: CPT | Performed by: PHARMACIST

## 2024-11-07 NOTE — Clinical Note
Vaishali--antonieta and I saw Meme today --wereggievy working well --down 23 lbs. Since June!!  -Aramis

## 2024-11-07 NOTE — PROGRESS NOTES
Medication Therapy Management (MTM) Encounter    ASSESSMENT:                            Medication Adherence/Access: No issues identified    Class III Obesity (BMI 40 or more):   Improving: Prioritize strength training to keep your muscle mass as you lose weight! You can go to [employer] 2 x in a week for 20-30 minutes. Don't have to use free weights. Nautilus machine is perfect.  --Keep taking your dog leland on 30-40 minute walks  -- When not hungry don't eat, and eat slow to avoid nausea.   --stay on current Wegovy 1.7mg./week dose.   PLAN:                            1. Clinic weight today = 267.6. You're losing 1 plus pounds a week which is fantastic! ( Overall you're down almost 23 pounds)  2.In order to preserve muscle mass, prioritize strength training. Go to [employer] 2 x a week for 20-30 minutes. Use N[employer]ilus machine    Follow-up: Return in about 3 months (around 2/10/2025), or @3:00PM, for BP Recheck, Weight loss, Medication Therapy Management Visit.      .   SUBJECTIVE/OBJECTIVE:                          Meme Gramajo is a 29 year old female seen for a follow-up (9-10-24) visit. She was referred to me from Dr. Vaishali Benedict.  .      Reason for visit:   weight managment     Allergies/ADRs: Reviewed in chart  Past Medical History: Reviewed in chart  Tobacco: She reports that she has never smoked. She has never used smokeless tobacco.  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   Personal Healthcare Goals: wt.loss goal = lose weight and fell better , more active.   Activity: not hardly at all--tiny dog --2 blocks.       Medication Adherence/Access: no issues reported  Weight Management   Wegovy 1.7 mg once weekly   Zofran 4mg as needed when she gets nauseous  Milarax daily  Patient reports the following medication side effects: The patient started out having nausea with the dose increase to 1.7 mg wegovy.  "Over time, the most persistent side effect was abdominal pain. The patient says the pain has improved. It mostly happens for three days, starting from when the patient administers the wegovy on Sunday. The abdominal pain lasts through to Tuesday. She said its a pain she can tolerate. She also said its a pain that's lessened to a degree with each new week. The patient takes Miralax daily and it helps with her bowl movements. She hasn't experienced constipation. Sometimes she used the zofran when she would get nauseous. Nutrition/Eating Habits: The patients appetite has decreased   Exercise/Activity: She walks her dog Gin 30-40 minutes/day.  She has membership at [employer] but not yet going/using.     Medications Tried/Failed:  None.     Initial Consult Weight: 289.6lbs. 6-24-24.     Current weight today: 267 lbs 6.4 oz  Cumulative Weight Loss: 23 lb    Wt Readings from Last 5 Encounters:   11/07/24 267 lb 6.4 oz (121.3 kg)   09/10/24 277 lb 11.2 oz (126 kg)   08/30/24 281 lb (127.5 kg)   08/05/24 283 lb 1.6 oz (128.4 kg)   06/24/24 289 lb 9.6 oz (131.4 kg)     Lab Results   Component Value Date    A1C 5.3 08/30/2024    A1C 5.8 05/08/2024        Estimated body mass index is 46.62 kg/m  as calculated from the following:    Height as of 8/30/24: 5' 3.5\" (1.613 m).    Weight as of this encounter: 267 lb 6.4 oz (121.3 kg).      Previously  Weight Management   Wegovy 1mg./week- working well lost another 6 lbs.  Mild side effects now.  No BM issues.  This is the first dose that stops her food noise and gets full very fast .     Eating protein first , lives by [place] but not going .     [place] dog daily.     Wegovy 0.25mg./week x 4 weeks- going well--now 0.5mg./week- did get ill on first dose --second dose last Saturday --tolerates well now.   Eats proteins first but struggles with eating other sources of proteins other than meats --like greek yogurt.   Struggles to get exercise - feels new job end of August will help. "     Previously :   Never tried any wt.loss meds.     Nutrition/Eating Habits: tried dieting in the past many times --nothing has ever worked.  Bfast- coffee and oat milk, cereal or yogurt or bagels.   Lunch ; small meals -pea pods, dried fruit , eggs   Dinner; cooks at home - tonight pasta with shrimp and garlic bread  Late night snacks --loves ice cream --1/2 cup after dinner 3 x week.      Exercise/Activity: sedentary   Medication History:  None.          Today's Vitals: /78   Pulse 111   Wt 267 lb 6.4 oz (121.3 kg)   SpO2 98%   BMI 46.62 kg/m    ----------------      I spent 20 minutes with this patient today. All changes were made via collaborative practice agreement with Dr. Vaishali Benedict MD. A copy of the visit note was provided to the patient's provider(s).    A summary of these recommendations was given to the patient.    Aramis Espinoza Rph.  Medication Therapy Management Provider  [phone]    Martín Garcia   Pharm-D 4 student      Medication Therapy Recommendations  No medication therapy recommendations to display

## 2024-11-07 NOTE — PATIENT INSTRUCTIONS
"Recommendations from today's MTM visit:                                                         1. Clinic weight today = 267.6. You're losing 1 plus pounds a week which is fantastic! ( Overall you're down almost 23 pounds)  2.In order to preserve muscle mass, prioritize strength training. Go to [employer] 2 x a week for 20-30 minutes. Use N[employer]ilus machine    Follow-up: Return in about 3 months (around 2/10/2025), or @3:00PM, for BP Recheck, Weight loss, Medication Therapy Management Visit.    It was great speaking with you today.  I value your experience and would be very thankful for your time in providing feedback in our clinic survey. In the next few days, you may receive an email or text message from [employer] with a link to a survey related to your  clinical pharmacist.\"     To schedule another MTM appointment, please call the clinic directly or you may call the MTM scheduling line at [phone] or toll-free at [phone].     My Clinical Pharmacist's contact information:                                                      Please feel free to contact me with any questions or concerns you have.      Aramis Espinoza Rph.  Medication Therapy Management Provider  [phone]    "

## 2024-11-20 NOTE — TELEPHONE ENCOUNTER
"FUTURE VISIT INFORMATION      FUTURE VISIT INFORMATION:  Date: 1/13/25  Time: 11:30AM  [place] [facility]  REFERRAL INFORMATION:  Referring provider:  Vaishali Benedict MD  Referring providers clinic:  [facility]  Reason for visit/diagnosis  Deviated nasal septum [J34.2]. History of enlarged tonsils [Z87.09]. Ref by Vaishali Benedict MD. [facility] verified     RECORDS REQUESTED FROM:       Clinic name Comments Records Status Imaging Status   [facility]  5/29/24- OV w.  Vaishali Benedict MD Epic             \"Please notify/message CSS if patient completed outside imaging prior to scheduled appointment and/or any outside records that might have been missed at pre visit -Thanks\"  "

## 2025-01-12 NOTE — PROGRESS NOTES
[facility] [address] [address]  Phone: [phone]  Fax: [phone]    Patient:  Meme Gramajo, Date of birth 1995  Date of Visit:  01/12/2025  Referring Provider Vaishali Benedict      Assessment & Plan      (J34.2) Deviated nasal septum  (primary encounter diagnosis)  Comment: chronic nasal congestion and possible ETD. May consider septoplasty if flonase ineffective.  Plan: Adult ENT Clinic Follow-up Order (Blank), CT         Sinus w/o Contrast, fluticasone (FLONASE) 50         MCG/ACT nasal spray      (H69.91) Dysfunction of right eustachian tube  Comment: will review imaging  Plan: flonase          Flonase, CT sinus. See me 2 months.           Linsey Dong MD       Otolaryngology Adult Consultation    HPI: Meme Gramajo is a 29 year old female seen today in the Otolaryngology Clinic in consultation from Vaishali Benedict for a history of difficulty breathing through nose.  Symptoms include left more plugged, worse when laying down. Duration of symptoms has been years. Also has constant PND. Never diagnosed with allergies but does have sneezing itching watery eyes. Has taken OTC allergy meds, no sprays. Had adenoids removed age 3. Associated lower respiratory symptoms are none.  Ears feel plugged, clicking noises.     Previous work up has been performed including none.    Current Outpatient Medications   Medication Sig Dispense Refill    etonogestrel (NEXPLANON) 68 MG IMPL Inject 1 each Subcutaneous once      liothyronine (CYTOMEL) 25 MCG tablet Take 25 mcg by mouth daily      ondansetron (ZOFRAN) 4 MG tablet Take 1 tablet (4 mg) by mouth every 8 hours as needed for nausea 30 tablet 1    Semaglutide-Weight Management (WEGOVY) 1.7 MG/0.75ML pen Inject 1.7 mg subcutaneously once a week. 3 mL 3    vilazodone (VIIBRYD) 20 MG TABS tablet Take 20 mg by mouth daily      vilazodone (VIIBRYD) 40 MG TABS tablet Take 40 mg  by mouth daily       No current facility-administered medications for this visit.          Allergies   Allergen Reactions    Azithromycin Cramps, Diarrhea, GI Disturbance, Nausea and Nausea and Vomiting       No past medical history on file.    Social History     Occupational History    Not on file   Tobacco Use    Smoking status: Never    Smokeless tobacco: Never   Vaping Use    Vaping status: Never Used   Substance and Sexual Activity    Alcohol use: Not Currently     Comment: once every 6 months    Drug use: Never    Sexual activity: Not on file        Review of Systems      1/8/2025    11:50 AM    ENT ROS   Constitutional Problems with sleep   Neurology Headache   Ears, Nose, Throat Ear pain    Ringing/noise in ears    Nasal congestion or drainage        14 point ROS neg other than the symptoms noted above.    Physical Exam:    General Assessment   The patient is alert, oriented and in no acute distress.   Head/Face/Scalp  Grossly normal.   Ears  Normal canals, auricles and tympanic membranes. R with some darkness behind TM, ?effusion.  Nose  External nose is deviated to left, skin is normal. Intranasal exam (anterior rhinoscopy) reveals normal moist mucosa, turbinate tissue with enlargement on L, no erythema or crusting.  Septum deviated to R, bowed like an overstuffed envelope.

## 2025-01-13 ENCOUNTER — PRE VISIT (OUTPATIENT)
Dept: OTOLARYNGOLOGY | Facility: CLINIC | Age: 30
End: 2025-01-13

## 2025-01-13 ENCOUNTER — OFFICE VISIT (OUTPATIENT)
Dept: OTOLARYNGOLOGY | Facility: CLINIC | Age: 30
End: 2025-01-13
Attending: FAMILY MEDICINE
Payer: COMMERCIAL

## 2025-01-13 VITALS
HEART RATE: 127 BPM | SYSTOLIC BLOOD PRESSURE: 133 MMHG | BODY MASS INDEX: 43.57 KG/M2 | HEIGHT: 64 IN | OXYGEN SATURATION: 96 % | DIASTOLIC BLOOD PRESSURE: 88 MMHG | WEIGHT: 255.2 LBS

## 2025-01-13 DIAGNOSIS — H69.91 DYSFUNCTION OF RIGHT EUSTACHIAN TUBE: ICD-10-CM

## 2025-01-13 DIAGNOSIS — J34.2 DEVIATED NASAL SEPTUM: Primary | ICD-10-CM

## 2025-01-13 PROCEDURE — 99202 OFFICE O/P NEW SF 15 MIN: CPT | Performed by: OTOLARYNGOLOGY

## 2025-01-13 RX ORDER — FLUTICASONE PROPIONATE 50 MCG
2 SPRAY, SUSPENSION (ML) NASAL DAILY
Qty: 16 G | Refills: 3 | Status: SHIPPED | OUTPATIENT
Start: 2025-01-13

## 2025-01-13 ASSESSMENT — PAIN SCALES - GENERAL: PAINLEVEL_OUTOF10: NO PAIN (0)

## 2025-01-13 NOTE — NURSING NOTE
"Chief Complaint   Patient presents with    Consult   Blood pressure 133/88, pulse (!) 127, height 1.613 m (5' 3.5\"), weight 115.8 kg (255 lb 3.2 oz), SpO2 96%. Darshan Meyer, EMT    "

## 2025-01-13 NOTE — LETTER
1/13/2025       RE: Meme Gramajo  [address] [facility] [address]     Dear Colleague,    Thank you for referring your patient, Meme Gramajo, to the [facility] [place] [facility]. Please see a copy of my visit note below.      [facility] [place]  [address]  [address]LO[address]  [facility] [address]  Phone: [phone]  Fax: [phone]    Patient:  Meme Gramajo, Date of birth 1995  Date of Visit:  01/12/2025  Referring Provider Vaishali Benedict      Assessment & Plan     (J34.2) Deviated nasal septum  (primary encounter diagnosis)  Comment: chronic nasal congestion and possible ETD. May consider septoplasty if flonase ineffective.  Plan: Adult ENT Clinic Follow-up Order (Blank), CT         Sinus w/o Contrast, fluticasone (FLONASE) 50         MCG/ACT nasal spray      (H69.91) Dysfunction of right eustachian tube  Comment: will review imaging  Plan: flonase          Flonase, CT sinus. See me 2 months.           Linsey Dong MD       Otolaryngology Adult Consultation    HPI: Meme Gramajo is a 29 year old female seen today in the Otolaryngology Clinic in consultation from Vaishali Benedict for a history of difficulty breathing through nose.  Symptoms include left more plugged, worse when laying down. Duration of symptoms has been years. Also has constant PND. Never diagnosed with allergies but does have sneezing itching watery eyes. Has taken OTC allergy meds, no sprays. Had adenoids removed age 3. Associated lower respiratory symptoms are none.  Ears feel plugged, clicking noises.     Previous work up has been performed including none.    Current Outpatient Medications   Medication Sig Dispense Refill     etonogestrel (NEXPLANON) 68 MG IMPL Inject 1 each Subcutaneous once       liothyronine (CYTOMEL) 25 MCG tablet Take 25 mcg by mouth daily        ondansetron (ZOFRAN) 4 MG tablet Take 1 tablet (4 mg) by mouth every 8 hours as needed for nausea 30 tablet 1     Semaglutide-Weight Management (WEGOVY) 1.7 MG/0.75ML pen Inject 1.7 mg subcutaneously once a week. 3 mL 3     vilazodone (VIIBRYD) 20 MG TABS tablet Take 20 mg by mouth daily       vilazodone (VIIBRYD) 40 MG TABS tablet Take 40 mg by mouth daily       No current facility-administered medications for this visit.          Allergies   Allergen Reactions     Azithromycin Cramps, Diarrhea, GI Disturbance, Nausea and Nausea and Vomiting       No past medical history on file.    Social History     Occupational History     Not on file   Tobacco Use     Smoking status: Never     Smokeless tobacco: Never   Vaping Use     Vaping status: Never Used   Substance and Sexual Activity     Alcohol use: Not Currently     Comment: once every 6 months     Drug use: Never     Sexual activity: Not on file        Review of Systems      1/8/2025    11:50 AM   [facility] ENT ROS   Constitutional Problems with sleep   Neurology Headache   Ears, Nose, Throat Ear pain    Ringing/noise in ears    Nasal congestion or drainage        14 point ROS neg other than the symptoms noted above.    Physical Exam:    General Assessment   The patient is alert, oriented and in no acute distress.   Head/Face/Scalp  Grossly normal.   Ears  Normal canals, auricles and tympanic membranes. R with some darkness behind TM, ?effusion.  Nose  External nose is deviated to left, skin is normal. Intranasal exam (anterior rhinoscopy) reveals normal moist mucosa, turbinate tissue with enlargement on L, no erythema or crusting.  Septum deviated to R, bowed like an overstuffed envelope.                      Again, thank you for allowing me to participate in the care of your patient.      Sincerely,    Linsey Dong MD

## 2025-01-13 NOTE — PATIENT INSTRUCTIONS
You were seen in the ENT Clinic today by Dr. Dong. If you have any questions or concerns after your appointment, please contact us (see below)       2.   The following recommendations have been made based upon your appointment today:   -Flonase (fluticasone) nasal spray: Spray 2 sprays in each nostril once daily.   -Obtain CT scan at your convenience.      3.   Plan to return to the ENT clinic in 2 months.           How to Contact Us:  Send a [employer] message to your provider. Our team will respond to you via [employer]. Occasionally, we will need to call you to get further information.  For urgent matters (Monday-Friday), call the ENT Clinic: [phone] and speak with a call center team member - they will route your call appropriately.   If you'd like to speak directly with a nurse, please find our contact information below. We do our best to check voicemail frequently throughout the day, and will work to call you back within 1-2 days. For urgent matters, please use the general clinic phone numbers listed above.     Catie CALVERT RN  Direct: [phone]  Nicolette HERNANDEZ LPN  Direct: [phone]         [facility]  Department of Otolaryngology

## 2025-01-20 ENCOUNTER — ANCILLARY PROCEDURE (OUTPATIENT)
Dept: CT IMAGING | Facility: CLINIC | Age: 30
End: 2025-01-20
Attending: OTOLARYNGOLOGY
Payer: COMMERCIAL

## 2025-01-20 DIAGNOSIS — J34.2 DEVIATED NASAL SEPTUM: ICD-10-CM

## 2025-01-20 PROCEDURE — 70486 CT MAXILLOFACIAL W/O DYE: CPT | Performed by: RADIOLOGY

## 2025-01-24 SDOH — HEALTH STABILITY: PHYSICAL HEALTH: ON AVERAGE, HOW MANY DAYS PER WEEK DO YOU ENGAGE IN MODERATE TO STRENUOUS EXERCISE (LIKE A BRISK WALK)?: 0 DAYS

## 2025-01-24 SDOH — HEALTH STABILITY: PHYSICAL HEALTH: ON AVERAGE, HOW MANY MINUTES DO YOU ENGAGE IN EXERCISE AT THIS LEVEL?: 30 MIN

## 2025-01-24 ASSESSMENT — SOCIAL DETERMINANTS OF HEALTH (SDOH): HOW OFTEN DO YOU GET TOGETHER WITH FRIENDS OR RELATIVES?: ONCE A WEEK

## 2025-02-05 ENCOUNTER — MYC MEDICAL ADVICE (OUTPATIENT)
Dept: PHARMACY | Facility: CLINIC | Age: 30
End: 2025-02-05
Payer: COMMERCIAL

## 2025-02-05 DIAGNOSIS — E66.01 MORBID OBESITY (H): ICD-10-CM

## 2025-02-05 NOTE — TELEPHONE ENCOUNTER
Dimitris Self,  Sorry if this is overkill as I ve already submitted a medication refill request - but I figured I d try here. I submitted a refill request for my Wegovy on the [email or web address] herbie on 2/01 and it didn t go through (in fact, I just checked and they ve cancelled the order for some reason ). I also submitted a refill request on here.     I m trying to get my Wegovy by 2/08 because I take it every Saturday and sometimes it takes quite awhile for pharmacies to fill the prescription/find it in stock.     Just making sure I can have it this week!  Thank you,  Meme Webb [facility]

## 2025-02-09 NOTE — PROGRESS NOTES
Medication Therapy Management (MTM) Encounter    ASSESSMENT:                            Medication Adherence/Access: No issues identified    Class III Obesity (BMI 40 or more):   Improving wonderfully: Prioritize strength training to keep your muscle mass as your losing weight! You can go to [employer] 2 x in a week for 20-30 minutes. Don't have to use free weights. Nautilus machine is perfect.  --Keep taking your dog leland on 30-40 minute walks  -- When not hungry don't eat, and eat slow to avoid nausea.   --stay on current Wegovy 1.7mg./week dose.     PLAN:                              1. Clinic weight today = 252.8lbs-- down another 15 lbs .-- your starting weight was 289.6lbs on 6-24-24.  WOW--that is 37 lbs. down --fantastic !  Continue to eat proteins at meals, stay well hydrated --please start strength training -- Go to [employer] 2 x a week for 20-30 minutes. Use Nautilus machines!  Because your still losing weight monthly -lets stay at Wegovy 1.7mg./week dose for now.          Follow-up: Return in about 3 months (around 5/20/2025), or @ 4:30 PM, for BP Recheck, Weight loss, Medication Therapy Management Visit.    .   SUBJECTIVE/OBJECTIVE:                          Meme Gramajo is a 29 year old female seen for a follow-up (11-7-24) visit. She was referred to me from Dr. Vaishali Benedict.  .      Reason for visit:   weight managment     Allergies/ADRs: Reviewed in chart  Past Medical History: Reviewed in chart  Tobacco: She reports that she has never smoked. She has never used smokeless tobacco.  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   Personal Healthcare Goals: wt.loss goal = lose weight and fell better , more active.   Activity: not hardly at all--tiny dog --2 blocks.       Medication Adherence/Access: no issues reported  Weight Management   Wegovy 1.7mg./week dose --working very well --no side effects  "--now down 37 lbs. Wt.loss since 6-2024.     Eats proteins at meals and staying well hydrated , has a [employer] membership but not using it.    Has known elevated HR (110+)--white coat HTN per pcp.  At home Blood Pressure 120/80's . HRate =90's .       Previously :   Wegovy 1.7 mg once weekly   Zofran 4mg as needed when she gets nauseous  Milarax daily  Patient reports the following medication side effects: The patient started out having nausea with the dose increase to 1.7 mg wegovy. Over time, the most persistent side effect was abdominal pain. The patient says the pain has improved. It mostly happens for three days, starting from when the patient administers the wegovy on Sunday. The abdominal pain lasts through to Tuesday. She said its a pain she can tolerate. She also said its a pain that's lessened to a degree with each new week. The patient takes Miralax daily and it helps with her bowl movements. She hasn't experienced constipation. Sometimes she used the zofran when she would get nauseous. Nutrition/Eating Habits: The patients appetite has decreased   Exercise/Activity: She walks her dog Gin 30-40 minutes/day.  She has membership at [employer] but not yet going/using.     Medications Tried/Failed:  None.     Initial Consult Weight: 289.6lbs. 6-24-24.     Current weight today: 252 lbs 12.8 oz  Cumulative Weight Loss: 23 lb    Wt Readings from Last 5 Encounters:   02/10/25 252 lb 12.8 oz (114.7 kg)   01/13/25 255 lb 3.2 oz (115.8 kg)   11/07/24 267 lb 6.4 oz (121.3 kg)   09/10/24 277 lb 11.2 oz (126 kg)   08/30/24 281 lb (127.5 kg)     Lab Results   Component Value Date    A1C 5.3 08/30/2024    A1C 5.8 05/08/2024        Estimated body mass index is 44.08 kg/m  as calculated from the following:    Height as of 1/13/25: 5' 3.5\" (1.613 m).    Weight as of this encounter: 252 lb 12.8 oz (114.7 kg).      Previously  Weight Management   Wegovy 1mg./week- working well lost another 6 lbs.  Mild side effects now.  No BM " issues.  This is the first dose that stops her food noise and gets full very fast .     Eating protein first , [facility] but not going .     [place] dog daily.     Wegovy 0.25mg./week x 4 weeks- going well--now 0.5mg./week- did get ill on first dose --second dose last Saturday --tolerates well now.   Eats proteins first but struggles with eating other sources of proteins other than meats --like greek yogurt.   Struggles to get exercise - feels new job end of August will help.     Previously :   Never tried any wt.loss meds.     Nutrition/Eating Habits: tried dieting in the past many times --nothing has ever worked.  Bfast- coffee and oat milk, cereal or yogurt or bagels.   Lunch ; small meals -pea pods, dried fruit , eggs   Dinner; cooks at home - tonight pasta with shrimp and garlic bread  Late night snacks --loves ice cream --1/2 cup after dinner 3 x week.      Exercise/Activity: sedentary   Medication History:  None.          Today's Vitals: BP (!) 128/92   Pulse 113   Wt 252 lb 12.8 oz (114.7 kg)   SpO2 96%   BMI 44.08 kg/m    ----------------      I spent 20 minutes with this patient today. All changes were made via collaborative practice agreement with Dr. Vaishali Benedict MD. A copy of the visit note was provided to the patient's provider(s).    A summary of these recommendations was given to the patient.    Aramis Espinoza [facility].  Medication Therapy Management Provider  [phone]         Medication Therapy Recommendations  No medication therapy recommendations to display

## 2025-02-10 ENCOUNTER — OFFICE VISIT (OUTPATIENT)
Dept: PHARMACY | Facility: CLINIC | Age: 30
End: 2025-02-10
Payer: COMMERCIAL

## 2025-02-10 VITALS
SYSTOLIC BLOOD PRESSURE: 128 MMHG | HEART RATE: 113 BPM | WEIGHT: 252.8 LBS | DIASTOLIC BLOOD PRESSURE: 92 MMHG | BODY MASS INDEX: 44.08 KG/M2 | OXYGEN SATURATION: 96 %

## 2025-02-10 DIAGNOSIS — E66.01 MORBID OBESITY (H): Primary | ICD-10-CM

## 2025-02-10 PROCEDURE — 99607 MTMS BY PHARM ADDL 15 MIN: CPT | Performed by: PHARMACIST

## 2025-02-10 PROCEDURE — 99605 MTMS BY PHARM NP 15 MIN: CPT | Performed by: PHARMACIST

## 2025-02-10 NOTE — Clinical Note
Vaishali--renea looking great --down 37 lbs. Now on wegovy --will continue same weekly dose as anne -melchor

## 2025-02-10 NOTE — PATIENT INSTRUCTIONS
"Recommendations from today's MTM visit:                                                         1. Clinic weight today = 252.8lbs-- down another 15 lbs .-- your starting weight was 289.6lbs on 6-24-24.  WOW--that is 37 lbs. down --fantastic !  Continue to eat proteins at meals, stay well hydrated --please start strength training -- [facility] x a week for 20-30 minutes. Use [employer] machines!  Because your still losing weight monthly -lets stay at Wegovy 1.7mg./week dose for now.          Follow-up: Return in about 3 months (around 5/20/2025), or @ 4:30 PM, for BP Recheck, Weight loss, Medication Therapy Management Visit.    It was great speaking with you today.  I value your experience and would be very thankful for your time in providing feedback in our clinic survey. In the next few days, you may receive an email or text message from [employer] with a link to a survey related to your  clinical pharmacist.\"     To schedule another MTM appointment, please call the clinic directly or you may call the MTM scheduling line at [phone] or toll-free at [phone].     My Clinical Pharmacist's contact information:                                                      Please feel free to contact me with any questions or concerns you have.      Aramis Espinoza [facility].  Medication Therapy Management Provider  [phone]    "

## 2025-02-17 DIAGNOSIS — E66.01 MORBID OBESITY (H): ICD-10-CM

## 2025-02-17 NOTE — TELEPHONE ENCOUNTER
Pending Prescriptions:                       Disp   Refills    Semaglutide-Weight Management (WEGOVY) 1.*3 mL   3            Sig: Inject 1.7 mg subcutaneously once a week.

## 2025-03-09 SDOH — HEALTH STABILITY: PHYSICAL HEALTH: ON AVERAGE, HOW MANY DAYS PER WEEK DO YOU ENGAGE IN MODERATE TO STRENUOUS EXERCISE (LIKE A BRISK WALK)?: 0 DAYS

## 2025-03-09 SDOH — HEALTH STABILITY: PHYSICAL HEALTH: ON AVERAGE, HOW MANY MINUTES DO YOU ENGAGE IN EXERCISE AT THIS LEVEL?: 30 MIN

## 2025-03-09 ASSESSMENT — SOCIAL DETERMINANTS OF HEALTH (SDOH): HOW OFTEN DO YOU GET TOGETHER WITH FRIENDS OR RELATIVES?: ONCE A WEEK

## 2025-03-12 ENCOUNTER — OFFICE VISIT (OUTPATIENT)
Dept: FAMILY MEDICINE | Facility: CLINIC | Age: 30
End: 2025-03-12
Payer: COMMERCIAL

## 2025-03-12 ENCOUNTER — MYC MEDICAL ADVICE (OUTPATIENT)
Dept: PHARMACY | Facility: CLINIC | Age: 30
End: 2025-03-12

## 2025-03-12 VITALS
HEART RATE: 117 BPM | OXYGEN SATURATION: 99 % | DIASTOLIC BLOOD PRESSURE: 88 MMHG | RESPIRATION RATE: 16 BRPM | TEMPERATURE: 97.5 F | HEIGHT: 63 IN | SYSTOLIC BLOOD PRESSURE: 132 MMHG | BODY MASS INDEX: 44.12 KG/M2 | WEIGHT: 249 LBS

## 2025-03-12 DIAGNOSIS — N89.8 VAGINAL DISCHARGE: ICD-10-CM

## 2025-03-12 DIAGNOSIS — R11.0 NAUSEA: ICD-10-CM

## 2025-03-12 DIAGNOSIS — Z00.00 ROUTINE GENERAL MEDICAL EXAMINATION AT A HEALTH CARE FACILITY: ICD-10-CM

## 2025-03-12 DIAGNOSIS — E66.01 MORBID OBESITY (H): ICD-10-CM

## 2025-03-12 DIAGNOSIS — Z12.4 CERVICAL CANCER SCREENING: ICD-10-CM

## 2025-03-12 DIAGNOSIS — R73.03 PREDIABETES: ICD-10-CM

## 2025-03-12 DIAGNOSIS — Z11.4 SCREENING FOR HIV (HUMAN IMMUNODEFICIENCY VIRUS): ICD-10-CM

## 2025-03-12 DIAGNOSIS — Z11.59 NEED FOR HEPATITIS C SCREENING TEST: ICD-10-CM

## 2025-03-12 LAB
CLUE CELLS: NORMAL
EST. AVERAGE GLUCOSE BLD GHB EST-MCNC: 105 MG/DL
HBA1C MFR BLD: 5.3 % (ref 0–5.6)
TRICHOMONAS, WET PREP: NORMAL
WBC'S/HIGH POWER FIELD, WET PREP: NORMAL
YEAST, WET PREP: NORMAL

## 2025-03-12 PROCEDURE — 80061 LIPID PANEL: CPT | Performed by: FAMILY MEDICINE

## 2025-03-12 PROCEDURE — 36415 COLL VENOUS BLD VENIPUNCTURE: CPT | Performed by: FAMILY MEDICINE

## 2025-03-12 PROCEDURE — 87389 HIV-1 AG W/HIV-1&-2 AB AG IA: CPT | Performed by: FAMILY MEDICINE

## 2025-03-12 PROCEDURE — 86803 HEPATITIS C AB TEST: CPT | Performed by: FAMILY MEDICINE

## 2025-03-12 PROCEDURE — 99395 PREV VISIT EST AGE 18-39: CPT | Performed by: FAMILY MEDICINE

## 2025-03-12 PROCEDURE — 87624 HPV HI-RISK TYP POOLED RSLT: CPT | Performed by: FAMILY MEDICINE

## 2025-03-12 PROCEDURE — G0145 SCR C/V CYTO,THINLAYER,RESCR: HCPCS | Performed by: FAMILY MEDICINE

## 2025-03-12 PROCEDURE — 83036 HEMOGLOBIN GLYCOSYLATED A1C: CPT | Performed by: FAMILY MEDICINE

## 2025-03-12 PROCEDURE — 82306 VITAMIN D 25 HYDROXY: CPT | Performed by: FAMILY MEDICINE

## 2025-03-12 PROCEDURE — 87210 SMEAR WET MOUNT SALINE/INK: CPT | Performed by: FAMILY MEDICINE

## 2025-03-12 RX ORDER — ONDANSETRON 4 MG/1
4 TABLET, FILM COATED ORAL EVERY 8 HOURS PRN
Qty: 30 TABLET | Refills: 1 | Status: CANCELLED | OUTPATIENT
Start: 2025-03-12

## 2025-03-12 ASSESSMENT — PAIN SCALES - GENERAL: PAINLEVEL_OUTOF10: NO PAIN (0)

## 2025-03-12 ASSESSMENT — PATIENT HEALTH QUESTIONNAIRE - PHQ9
SUM OF ALL RESPONSES TO PHQ QUESTIONS 1-9: 4
10. IF YOU CHECKED OFF ANY PROBLEMS, HOW DIFFICULT HAVE THESE PROBLEMS MADE IT FOR YOU TO DO YOUR WORK, TAKE CARE OF THINGS AT HOME, OR GET ALONG WITH OTHER PEOPLE: NOT DIFFICULT AT ALL
SUM OF ALL RESPONSES TO PHQ QUESTIONS 1-9: 4

## 2025-03-12 NOTE — PATIENT INSTRUCTIONS
Patient Education   Preventive Care Advice   This is general advice given by our system to help you stay healthy. However, your care team may have specific advice just for you. Please talk to your care team about your preventive care needs.  Nutrition  Eat 5 or more servings of fruits and vegetables each day.  Try wheat bread, brown rice and whole grain pasta (instead of white bread, rice, and pasta).  Get enough calcium and vitamin D. Check the label on foods and aim for 100% of the RDA (recommended daily allowance).  Lifestyle  Exercise at least 150 minutes each week  (30 minutes a day, 5 days a week).  Do muscle strengthening activities 2 days a week. These help control your weight and prevent disease.  No smoking.  Wear sunscreen to prevent skin cancer.  Have a dental exam and cleaning every 6 months.  Yearly exams  See your health care team every year to talk about:  Any changes in your health.  Any medicines your care team has prescribed.  Preventive care, family planning, and ways to prevent chronic diseases.  Shots (vaccines)   HPV shots (up to age 26), if you've never had them before.  Hepatitis B shots (up to age 59), if you've never had them before.  COVID-19 shot: Get this shot when it's due.  Flu shot: Get a flu shot every year.  Tetanus shot: Get a tetanus shot every 10 years.  Pneumococcal, hepatitis A, and RSV shots: Ask your care team if you need these based on your risk.  Shingles shot (for age 50 and up)  General health tests  Diabetes screening:  Starting at age 35, Get screened for diabetes at least every 3 years.  If you are younger than age 35, ask your care team if you should be screened for diabetes.  Cholesterol test: At age 39, start having a cholesterol test every 5 years, or more often if advised.  Bone density scan (DEXA): At age 50, ask your care team if you should have this scan for osteoporosis (brittle bones).  Hepatitis C: Get tested at least once in your life.  STIs (sexually  transmitted infections)  Before age 24: Ask your care team if you should be screened for STIs.  After age 24: Get screened for STIs if you're at risk. You are at risk for STIs (including HIV) if:  You are sexually active with more than one person.  You don't use condoms every time.  You or a partner was diagnosed with a sexually transmitted infection.  If you are at risk for HIV, ask about PrEP medicine to prevent HIV.  Get tested for HIV at least once in your life, whether you are at risk for HIV or not.  Cancer screening tests  Cervical cancer screening: If you have a cervix, begin getting regular cervical cancer screening tests starting at age 21.  Breast cancer scan (mammogram): If you've ever had breasts, begin having regular mammograms starting at age 40. This is a scan to check for breast cancer.  Colon cancer screening: It is important to start screening for colon cancer at age 45.  Have a colonoscopy test every 10 years (or more often if you're at risk) Or, ask your provider about stool tests like a FIT test every year or Cologuard test every 3 years.  To learn more about your testing options, visit:   .  For help making a decision, visit:   https://bit.ly/lu71519.  Prostate cancer screening test: If you have a prostate, ask your care team if a prostate cancer screening test (PSA) at age 55 is right for you.  Lung cancer screening: If you are a current or former smoker ages 50 to 80, ask your care team if ongoing lung cancer screenings are right for you.  For informational purposes only. Not to replace the advice of your health care provider. Copyright   2023 [facility]. All rights reserved. Clinically reviewed by the [facility] Transitions Program. [employer] 832615 - REV 01/24.  Learning About Stress  What is stress?     Stress is your body's response to a hard situation. Your body can have a physical, emotional, or mental response. Stress is a fact of life for most people, and it  affects everyone differently. What causes stress for you may not be stressful for someone else.  A lot of things can cause stress. You may feel stress when you go on a job interview, take a test, or run a race. This kind of short-term stress is normal and even useful. It can help you if you need to work hard or react quickly. For example, stress can help you finish an important job on time.  Long-term stress is caused by ongoing stressful situations or events. Examples of long-term stress include long-term health problems, ongoing problems at work, or conflicts in your family. Long-term stress can harm your health.  How does stress affect your health?  When you are stressed, your body responds as though you are in danger. It makes hormones that speed up your heart, make you breathe faster, and give you a burst of energy. This is called the fight-or-flight stress response. If the stress is over quickly, your body goes back to normal and no harm is done.  But if stress happens too often or lasts too long, it can have bad effects. Long-term stress can make you more likely to get sick, and it can make symptoms of some diseases worse. If you tense up when you are stressed, you may develop neck, shoulder, or low back pain. Stress is linked to high blood pressure and heart disease.  Stress also harms your emotional health. It can make you wynn, tense, or depressed. Your relationships may suffer, and you may not do well at work or school.  What can you do to manage stress?  You can try these things to help manage stress:   Do something active. Exercise or activity can help reduce stress. Walking is a great way to get started. Even everyday activities such as housecleaning or yard work can help.  Try yoga or britany chi. These techniques combine exercise and meditation. You may need some training at first to learn them.  Do something you enjoy. For example, listen to music or go to a movie. Practice your hobby or do volunteer  "work.  Meditate. This can help you relax, because you are not worrying about what happened before or what may happen in the future.  Do guided imagery. Imagine yourself in any setting that helps you feel calm. You can use online videos, books, or a teacher to guide you.  Do breathing exercises. For example:  From a standing position, bend forward from the waist with your knees slightly bent. Let your arms dangle close to the floor.  Breathe in slowly and deeply as you return to a standing position. Roll up slowly and lift your head last.  Hold your breath for just a few seconds in the standing position.  Breathe out slowly and bend forward from the waist.  Let your feelings out. Talk, laugh, cry, and express anger when you need to. Talking with supportive friends or family, a counselor, or a chris leader about your feelings is a healthy way to relieve stress. Avoid discussing your feelings with people who make you feel worse.  Write. It may help to write about things that are bothering you. This helps you find out how much stress you feel and what is causing it. When you know this, you can find better ways to cope.  What can you do to prevent stress?  You might try some of these things to help prevent stress:  Manage your time. This helps you find time to do the things you want and need to do.  Get enough sleep. Your body recovers from the stresses of the day while you are sleeping.  Get support. Your family, friends, and community can make a difference in how you experience stress.  Limit your news feed. Avoid or limit time on social media or news that may make you feel stressed.  Do something active. Exercise or activity can help reduce stress. Walking is a great way to get started.  Where can you learn more?  Go to https://www.[employer].net/patiented  Enter N032 in the search box to learn more about \"Learning About Stress.\"  Current as of: October 24, 2023  Content Version: 14.3    2024 [employer]. "   Care instructions adapted under license by your healthcare professional. If you have questions about a medical condition or this instruction, always ask your healthcare professional. [employer] disclaims any warranty or liability for your use of this information.    Safer Sex: Care Instructions  Overview  Safer sex is a way to reduce your risk of getting a sexually transmitted infection (STI). It can also help prevent pregnancy.  Several products can help you practice safer sex and reduce your chance of STIs. One of the best is a condom. There are internal and external condoms. You can use a special rubber sheet (dental dam) for protection during oral sex. Disposable gloves can keep your hands from touching blood, semen, or other body fluids that can carry infections.  Remember that birth control methods such as diaphragms, IUDs, foams, and birth control pills do not stop you from getting STIs.  Follow-up care is a key part of your treatment and safety. Be sure to make and go to all appointments, and call your doctor if you are having problems. It's also a good idea to know your test results and keep a list of the medicines you take.  How can you care for yourself at home?  Think about getting vaccinated to help prevent hepatitis A, hepatitis B, and human papillomavirus (HPV). They can be spread through sex.  Use a condom every time you have sex. Use an external condom, which goes on the penis. Or use an internal condom, which goes into the vagina or anus.  Make sure you use the right size external condom. A condom that's too small can break easily. A condom that's too big can slip off during sex.  Use a new condom each time you have sex. Be careful not to poke a hole in the condom when you open the wrapper.  Don't use an internal condom and an external condom at the same time.  Never use petroleum jelly (such as Vaseline), grease, hand lotion, baby oil, or anything with oil in it. These products can  "make holes in the condom.  After intercourse, hold the edge of the condom as you remove it. This will help keep semen from spilling out of the condom.  Do not have sex with anyone who has symptoms of an STI, such as sores on the genitals or mouth.  Do not drink a lot of alcohol or use drugs before sex.  Limit your sex partners. Sex with one partner who has sex only with you can reduce your risk of getting an STI.  Don't share sex toys. But if you do share them, use a condom and clean the sex toys between each use.  Talk to any partners before you have sex. Talk about what you feel comfortable with and whether you have any boundaries with sex. And find out if your partner or partners may be at risk for any STI. Keep in mind that a person may be able to spread an STI even if they do not have symptoms. You and any partners may want to get tested for STIs.  Where can you learn more?  Go to https://www.[employer].net/patiented  Enter B608 in the search box to learn more about \"Safer Sex: Care Instructions.\"  Current as of: April 30, 2024  Content Version: 14.3    2024 [employer].   Care instructions adapted under license by your healthcare professional. If you have questions about a medical condition or this instruction, always ask your healthcare professional. [employer] disclaims any warranty or liability for your use of this information.       "

## 2025-03-12 NOTE — PROGRESS NOTES
Preventive Care Visit  [facility]  Vaishali Benedict MD, Family Medicine  Mar 12, 2025  {Provider  Link to SmartSet :146822}    {PROVIDER CHARTING PREFERENCE:283524}    Lesley Valentine is a 29 year old, presenting for the following:  Physical        3/12/2025     3:13 PM   Additional Questions   Roomed by Keily Mac          HPI    Advance Care Planning  Patient does not have a Health Care Directive: Discussed advance care planning with patient; information given to patient to review.      3/9/2025   General Health   How would you rate your overall physical health? Good   Feel stress (tense, anxious, or unable to sleep) To some extent   (!) STRESS CONCERN      3/9/2025   Nutrition   Three or more servings of calcium each day? Yes   Diet: Regular (no restrictions)   How many servings of fruit and vegetables per day? (!) 2-3   How many sweetened beverages each day? 0-1         3/9/2025   Exercise   Days per week of moderate/strenous exercise 0 days   Average minutes spent exercising at this level 30 min   (!) EXERCISE CONCERN      3/9/2025   Social Factors   Frequency of gathering with friends or relatives Once a week   Worry food won't last until get money to buy more No   Food not last or not have enough money for food? No   Do you have housing? (Housing is defined as stable permanent housing and does not include staying ouside in a car, in a tent, in an abandoned building, in an overnight shelter, or couch-surfing.) Yes   Are you worried about losing your housing? No   Lack of transportation? No   Unable to get utilities (heat,electricity)? No         3/9/2025   Dental   Dentist two times every year? Yes           1/24/2025   TB Screening   Were you born outside of the [place]? No         Today's PHQ-9 Score:       3/12/2025     3:05 PM   PHQ-9 SCORE   PHQ-9 Total Score MyChart 4 (Minimal depression)   PHQ-9 Total Score 4        Patient-reported         3/9/2025   Substance Use  "  Alcohol more than 3/day or more than 7/wk No   Do you use any other substances recreationally? No     Social History     Tobacco Use    Smoking status: Never    Smokeless tobacco: Never   Vaping Use    Vaping status: Never Used   Substance Use Topics    Alcohol use: Not Currently     Comment: once every 6 months    Drug use: Never     {Provider  If there are gaps in the social history shown above, please follow the link to update and then refresh the note Link to Social and Substance History :607716}     {Mammogram Decision Support (Optional):299554}          3/9/2025   One time HIV Screening   Previous HIV test? No         3/9/2025   STI Screening   New sexual partner(s) since last STI/HIV test? (!) YES     History of abnormal Pap smear: No - age 21-29 PAP every 3 years recommended             3/9/2025   Contraception/Family Planning   Questions about contraception or family planning No     {Provider  REQUIRED FOR AWV Use the storyboard to review patient history, after sections have been marked as reviewed, refresh note to capture documentation:115098}   Reviewed and updated as needed this visit by Provider                    {HISTORY OPTIONS (Optional):589404}    {ROS Picklists (Optional):291013}     Objective    Exam  /88 (BP Location: Right arm, Patient Position: Sitting, Cuff Size: Adult Large)   Pulse 117   Temp 97.5  F (36.4  C) (Temporal)   Resp 16   Ht 1.59 m (5' 2.6\")   Wt 112.9 kg (249 lb)   SpO2 99%   BMI 44.68 kg/m     Estimated body mass index is 44.68 kg/m  as calculated from the following:    Height as of this encounter: 1.59 m (5' 2.6\").    Weight as of this encounter: 112.9 kg (249 lb).    Physical Exam  {Exam Choices (Optional):997211}        Signed Electronically by: Vaishali Benedict MD  {Email feedback regarding this note to [email or web address]   :982637}  " "contraception or family planning No        Reviewed and updated as needed this visit by Provider                             Objective    Exam  /88 (BP Location: Right arm, Patient Position: Sitting, Cuff Size: Adult Large)   Pulse 117   Temp 97.5  F (36.4  C) (Temporal)   Resp 16   Ht 1.59 m (5' 2.6\")   Wt 112.9 kg (249 lb)   SpO2 99%   BMI 44.68 kg/m     Estimated body mass index is 44.68 kg/m  as calculated from the following:    Height as of this encounter: 1.59 m (5' 2.6\").    Weight as of this encounter: 112.9 kg (249 lb).    Physical Exam          Signed Electronically by: Vaishali Benedict MD    "

## 2025-03-13 LAB
CHOLEST SERPL-MCNC: 201 MG/DL
FASTING STATUS PATIENT QL REPORTED: NO
HCV AB SERPL QL IA: NONREACTIVE
HDLC SERPL-MCNC: 47 MG/DL
HIV 1+2 AB+HIV1 P24 AG SERPL QL IA: NONREACTIVE
LDLC SERPL CALC-MCNC: 135 MG/DL
NONHDLC SERPL-MCNC: 154 MG/DL
TRIGL SERPL-MCNC: 93 MG/DL
VIT D+METAB SERPL-MCNC: 36 NG/ML (ref 20–50)

## 2025-03-13 NOTE — TELEPHONE ENCOUNTER
Dimitris Self,     I just started my new job and I found out my insurance will now be [employer]. You told me to tell you when/if the change happened. I have to enroll by the 30th with coverage starting on April 1.     What do you know about Wegovy and [employer] coverage? I have the choice of [facility], [facility], or one of the ACOs. I know you re not an insurance rep, but do you have any inside info on any of these re: weightloss meds?     Thanks for any help you can give me!  Meme          Ask HR dept. At employer if they cover glp-1 injectable wt.loss meds?    Joseph

## 2025-03-17 LAB
BKR AP ASSOCIATED HPV REPORT: NORMAL
BKR LAB AP GYN ADEQUACY: NORMAL
BKR LAB AP GYN INTERPRETATION: NORMAL
BKR LAB AP PREVIOUS ABNORMAL: NORMAL
HPV HR 12 DNA CVX QL NAA+PROBE: NEGATIVE
HPV16 DNA CVX QL NAA+PROBE: NEGATIVE
HPV18 DNA CVX QL NAA+PROBE: NEGATIVE
HUMAN PAPILLOMA VIRUS FINAL DIAGNOSIS: NORMAL
PATH REPORT.COMMENTS IMP SPEC: NORMAL
PATH REPORT.COMMENTS IMP SPEC: NORMAL
PATH REPORT.RELEVANT HX SPEC: NORMAL

## 2025-04-07 ENCOUNTER — MYC MEDICAL ADVICE (OUTPATIENT)
Dept: PHARMACY | Facility: CLINIC | Age: 30
End: 2025-04-07

## 2025-04-08 NOTE — TELEPHONE ENCOUNTER
Juan, the ID is actually [id number].       Meme Gramajo  You2 hours ago (5:00 PM)       Dimitris Self,     I just got my new insurance card. I m going to send you the info so we can see if I can get a prior authorization for my Wegovy!     Name: Meme Gramajo   Type: [employer]  Payer ID: [id number]  ID: [id number]   Group: [id number]     I was able to fill my prescription before my old insurance ended, so I gave three pens of Wegovy left.      Please let me know if you need anything else from me. Thank you!    [facility] cannot find her in system yet.    Aramis Espinoza [facility].  Medication Therapy Management Provider  [phone]      My insurance became active on 4/1. Maybe I m not updated in the system yet? If need be, I can call [employer] tomorrow.

## 2025-04-08 NOTE — TELEPHONE ENCOUNTER
I called [employer] and basically it takes a few days for the system to actually catch up. My insurance is active, but I might not be in the system until their records update. I d maybe try early next week or something to see if it s good to go?      I m sending you this again just so you have it.     Name: Meme Gramajo  Type: [employer]  Payer ID: [id number]  ID: [id number]  Group: [id number]     For my plan, I don t have Rx numbers, etc. (as far as I can tell).     Please keep me updated when you try again! As always, I immensely appreciate your help.    Jon jones

## 2025-04-14 ENCOUNTER — MYC MEDICAL ADVICE (OUTPATIENT)
Dept: PHARMACY | Facility: CLINIC | Age: 30
End: 2025-04-14
Payer: COMMERCIAL

## 2025-04-15 NOTE — TELEPHONE ENCOUNTER
This is weird! [employer] says I m active (both on the phone and online). I m not sure why it s not showing up in the system. My other prescriptions are going through/being filled, so I m assuming it s working?      I do have my actual card. I m going to send you a picture of it so you have all the information I have. Truly I have no idea what s going on (but, we know how many hoops insurance makes you jump through just for the fun of it).      Thanks again for working so hard at this. We will figure it out together!   Attachments   [id number]     [id number]     [id number]    Mtm submitted wegovy pa to [employer] today -melchor      Thank you so much, Melchor! I just got the message that it already went through and was accepted! Seems like everything looks good now. I appreciate all your hard work on this.  I ll see you in a little more than a month.  Meme

## 2025-05-12 ENCOUNTER — OFFICE VISIT (OUTPATIENT)
Dept: OTOLARYNGOLOGY | Facility: CLINIC | Age: 30
End: 2025-05-12
Attending: OTOLARYNGOLOGY
Payer: COMMERCIAL

## 2025-05-12 VITALS
BODY MASS INDEX: 43.46 KG/M2 | OXYGEN SATURATION: 97 % | SYSTOLIC BLOOD PRESSURE: 125 MMHG | HEIGHT: 63 IN | DIASTOLIC BLOOD PRESSURE: 78 MMHG | WEIGHT: 245.3 LBS | HEART RATE: 123 BPM

## 2025-05-12 DIAGNOSIS — J34.2 DEVIATED NASAL SEPTUM: ICD-10-CM

## 2025-05-12 DIAGNOSIS — H69.91 DYSFUNCTION OF RIGHT EUSTACHIAN TUBE: ICD-10-CM

## 2025-05-12 DIAGNOSIS — R09.82 POST-NASAL DRAINAGE: Primary | ICD-10-CM

## 2025-05-12 PROCEDURE — 3074F SYST BP LT 130 MM HG: CPT | Performed by: OTOLARYNGOLOGY

## 2025-05-12 PROCEDURE — 3078F DIAST BP <80 MM HG: CPT | Performed by: OTOLARYNGOLOGY

## 2025-05-12 PROCEDURE — 1126F AMNT PAIN NOTED NONE PRSNT: CPT | Performed by: OTOLARYNGOLOGY

## 2025-05-12 PROCEDURE — 99213 OFFICE O/P EST LOW 20 MIN: CPT | Performed by: OTOLARYNGOLOGY

## 2025-05-12 ASSESSMENT — PAIN SCALES - GENERAL: PAINLEVEL_OUTOF10: NO PAIN (0)

## 2025-05-12 NOTE — PROGRESS NOTES
[facility] EAR NOSE AND THROAT CLINIC [address] [address]  Phone: [phone]  Fax: [phone]    Patient:  Meme Gramajo, Date of birth 1995  Date of Visit:  05/12/2025  Referring Provider Linsey Dong      Assessment & Plan      (R09.82) Post-nasal drainage  (primary encounter diagnosis)  (J34.2) Deviated nasal septum  (H69.91) Dysfunction of right eustachian tube    Comment: Discussed trial of flonase, minimally helpful. She is not interested in surgery at this time as nasal obstruction is not the primary issues. She continues to have PND and a clicking sensation in her ear.   Plan: Allergy referral, audiology referral.       20 minutes spent by me on the date of the encounter doing chart review, history and exam, documentation and further activities per the note       Linsey Dong MD         Patient seen previously for rhinitis, nasal congestion and ETD. Trial of flonase helped a bit, but not totally effective. She still is bothered by her ear symptoms and excessive nasal discharge.     Exam: R TM is thickened, slight erythema along posterior canal. L TM normal. Left nasal passage narrowed, spur along floor. Careful review of the CT shows no obvious abnormality to explain symptoms.

## 2025-05-12 NOTE — NURSING NOTE
"Chief Complaint   Patient presents with    RECHECK   Blood pressure 125/78, pulse (!) 123, height 1.588 m (5' 2.5\"), weight 111.3 kg (245 lb 4.8 oz), SpO2 97%. Darshan Meyer, EMT    "

## 2025-05-12 NOTE — LETTER
5/12/2025       RE: Meme Gramajo  [address] [facility] [address]     Dear Colleague,    Thank you for referring your patient, Meme Gramajo, to the [facility] EAR NOSE AND THRO[facility]IN[facility] [place] [facility]. Please see a copy of my visit note below.        [facility] [place]  [address] [facility]  [address] [facility] [address]  Phone: [phone]  Fax: [phone]    Patient:  Meme Gramajo, Date of birth 1995  Date of Visit:  05/12/2025  Referring Provider Linsey Dong      Assessment & Plan     (R09.82) Post-nasal drainage  (primary encounter diagnosis)  (J34.2) Deviated nasal septum  (H69.91) Dysfunction of right eustachian tube    Comment: Discussed trial of flonase, minimally helpful. She is not interested in surgery at this time as nasal obstruction is not the primary issues. She continues to have PND and a clicking sensation in her ear.   Plan: Allergy referral, audiology referral.       20 minutes spent by me on the date of the encounter doing chart review, history and exam, documentation and further activities per the note       Linsey Dong MD         Patient seen previously for rhinitis, nasal congestion and ETD. Trial of flonase helped a bit, but not totally effective. She still is bothered by her ear symptoms and excessive nasal discharge.     Exam: R TM is thickened, slight erythema along posterior canal. L TM normal. Left nasal passage narrowed, spur along floor. Careful review of the CT shows no obvious abnormality to explain symptoms.         Again, thank you for allowing me to participate in the care of your patient.      Sincerely,    Linsey Dong MD

## 2025-05-12 NOTE — PATIENT INSTRUCTIONS
You were seen in the ENT Clinic today by Dr. Dong. If you have any questions or concerns after your appointment, please contact us (see below)       2.   The following recommendations have been made based upon your appointment today:  A referral has been placed to allergy.  We have placed an order for a hearing test.      3.   Plan to return to the ENT clinic as needed.           How to Contact Us:  Send a Visi[employer]ate message to your provider. Our team will respond to you via VisiQuate. Occasionally, we will need to call you to get further information.  For urgent matters (Monday-Friday), call the ENT Clinic: [phone] and speak with a call center team member - they will route your call appropriately.   If you'd like to speak directly with a nurse, please find our contact information below. We do our best to check voicemail frequently throughout the day, and will work to call you back within 1-2 days. For urgent matters, please use the general clinic phone numbers listed above.     Catie CALVERT RN  Direct: [phone]  Nicolette HERNANDEZ LPN  Direct: [phone]         [facility]  Department of Otolaryngology

## 2025-05-20 ENCOUNTER — MYC MEDICAL ADVICE (OUTPATIENT)
Dept: PHARMACY | Facility: CLINIC | Age: 30
End: 2025-05-20
Payer: COMMERCIAL

## 2025-05-20 DIAGNOSIS — E66.01 MORBID OBESITY (H): Primary | ICD-10-CM

## 2025-05-20 NOTE — TELEPHONE ENCOUNTER
Dimitris Self,     I had an appointment with you today at 4:30, but I ve actually just had to leave work because I have some sort of virus and started running a fever. The soonest I can reschedule is for June 24th.     I have one more pen of the 1.7mg Wegovy left. What I was going to talk to you about today was going up to 2.4mg. My appetite has basically reverted to what it was before taking Wegovy, food noise is coming back, and I m not losing weight. I think I need to up the dosage, but I always defer to you because you know best.     Since I can t see you for a little bit (at least until I get better), what do you think would be a a good way to proceed?     Thanks and sorry for having to cancel! Hope you have a good rest of your day,  Meme    ----------------------------------------------------------------------------      Mtm will increase wegovy dose to 2.4mg./week now and reschedule her to next month f/up.    Aramis Espinoza [facility].  Medication Therapy Management Provider  [phone]

## 2025-06-16 DIAGNOSIS — R09.82 POST-NASAL DRAINAGE: Primary | ICD-10-CM

## 2025-06-16 DIAGNOSIS — J34.2 DEVIATED NASAL SEPTUM: ICD-10-CM

## 2025-06-17 RX ORDER — FLUTICASONE PROPIONATE 50 MCG
2 SPRAY, SUSPENSION (ML) NASAL DAILY
Qty: 16 G | Refills: 3 | Status: SHIPPED | OUTPATIENT
Start: 2025-06-17

## 2025-06-17 NOTE — TELEPHONE ENCOUNTER
Last Written Prescription:  fluticasone (FLONASE) 50 MCG/ACT nasal spray 16 g 3 1/13/2025     ----------------------  Last Visit Date: 5/12/25  Future Visit Date: none  ----------------------    Refill decision: Medication unable to be refilled by RN due to: Medication not included in refill protocol policy     Not on ENT refill protocol    Request from pharmacy:  Requested Prescriptions   Pending Prescriptions Disp Refills    fluticasone (FLONASE) 50 MCG/ACT nasal spray 16 g 3     Sig: Spray 2 sprays into both nostrils daily.       Nasal Allergy Protocol Failed - 6/17/2025 10:21 AM        Failed - Medication indicated for associated diagnosis     Medication is associated with one or more of the following diagnoses:   Allergic conjunctivitis  Allergies  Nasal congestion  Nasal discharge  Rhinitis  Sinus congestion  Recurrent acute maxillary sinusitis  Environmental allergies   Acute sinusitis   Cystic Fibrosis  Bronchiectasis          Passed - Patient is age 12 or older        Passed - Medication is active on med list and the sig matches. RN to manually verify dose and sig if red X/fail.     If the protocol passes (green check), you do not need to verify med dose and sig.    A prescription matches if they are the same clinical intention.    For Example: once daily and every morning are the same.    The protocol can not identify upper and lower case letters as matching and will fail.     For Example: Take 1 tablet (50 mg) by mouth daily     TAKE 1 TABLET (50 MG) BY MOUTH DAILY    For all fails (red x), verify dose and sig.    If the refill does match what is on file, the RN can still proceed to approve the refill request.       If they do not match, route to the appropriate provider.             Passed - Recent (12 month) or future (90 days) visit with authorizing provider's specialty (provided they have been seen in the past 15 months)     The patient must have completed an in-person or virtual visit within the past  12 months or has a future visit scheduled within the next 90 days with the authorizing provider s specialty.  Urgent care and e-visits do not qualify as an office visit for this protocol.

## 2025-07-05 NOTE — PROGRESS NOTES
Medication Therapy Management (MTM) Encounter    ASSESSMENT:                            Medication Adherence/Access: No issues identified    Class III Obesity (BMI 40 or more):   Improving wonderfully: Prioritize strength training to keep your muscle mass as your losing weight! You can go to [employer] 2 x in a week for 20-30 minutes. Don't have to use free weights. [employer] machine is perfect.  --Keep taking your dog leland on 30-40 minute walks/day.  -- When not hungry don't eat, and eat slow to avoid nausea.   --stay on current Wegovy 2.4mg./week dose.     PLAN:                              1. Clinic weight today = 238.6lbs ---down from 252.8lbs -- down for a total of 51lbs. Amazing !    Continue to eat proteins at meals, stay well hydrated --continue strength training -- Go to [employer] 2-3 x a week for 20-30 minutes. Use [employer] machines!    Stay on weekly Wegovy 2.4mg. dose !    A great monthly goal is to lose 2lbs.!        Follow-up: with your new primary care provider Keily Gross In September .     .   SUBJECTIVE/OBJECTIVE:                          Meme Gramajo is a 30 year old female seen for a follow-up (2-10-25) visit. She was referred to me from Dr. Vaishali Benedict.now will see Keily Gross PA-C. As her new PCP.  .      Reason for visit:   weight managment     Allergies/ADRs: Reviewed in chart  Past Medical History: Reviewed in chart  Tobacco: She reports that she has never smoked. She has never used smokeless tobacco.  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   Personal Healthcare Goals: wt.loss goal = lose weight and fell better , more active. <200lbs.   Activity: not hardly at all--tiny dog --2 blocks.       Medication Adherence/Access: no issues reported    Weight Management :  Wegovy 2.4mg./week- last 2 months --tolerates very well .     Walks at least 3 miles/day at work --works as an  evolutionary microbiologist at [employer] .       Continue to eat proteins at meals, stay well hydrated -she did start strength training -- Go to [employer] 2 x a week for 20-30 minutes. Using [employer] machines!    Previously ;   Wegovy 1.7mg./week dose --working very well --no side effects --now down 37 lbs. Wt.loss since 6-2024.     Eats proteins at meals and staying well hydrated , has a [email or web address] membership but not using it.    Has known elevated HR (110+)--white coat HTN per pcp.  At home Blood Pressure 120/80's . HRate =90's .       Previously :   Wegovy 1.7 mg once weekly   Zofran 4mg as needed when she gets nauseous  Milarax daily  Patient reports the following medication side effects: The patient started out having nausea with the dose increase to 1.7 mg wegovy. Over time, the most persistent side effect was abdominal pain. The patient says the pain has improved. It mostly happens for three days, starting from when the patient administers the wegovy on Sunday. The abdominal pain lasts through to Tuesday. She said its a pain she can tolerate. She also said its a pain that's lessened to a degree with each new week. The patient takes Miralax daily and it helps with her bowl movements. She hasn't experienced constipation. Sometimes she used the zofran when she would get nauseous. Nutrition/Eating Habits: The patients appetite has decreased   Exercise/Activity: She walks her dog Gin 30-40 minutes/day.  She has membership at [employer] but not yet going/using.     Medications Tried/Failed:  None.     Initial Consult Weight: 289.6lbs. 6-24-24.     Current weight today: 238 lbs 9.6 oz  Cumulative Weight Loss: 51 lbs.    Wt Readings from Last 5 Encounters:   07/14/25 238 lb 9.6 oz (108.2 kg)   05/12/25 245 lb 4.8 oz (111.3 kg)   03/12/25 249 lb (112.9 kg)   02/10/25 252 lb 12.8 oz (114.7 kg)   01/13/25 255 lb 3.2 oz (115.8 kg)         Estimated body mass index is 42.95 kg/m  as calculated from the following:    Height as of  "5/12/25: 5' 2.5\" (1.588 m).    Weight as of this encounter: 238 lb 9.6 oz (108.2 kg).      Previously  Weight Management   Wegovy 1mg./week- working well lost another 6 lbs.  Mild side effects now.  No BM issues.  This is the first dose that stops her food noise and gets full very fast .     Eating protein first , lives [place] [employer] but not going .     [place] dog daily.     Wegovy 0.25mg./week x 4 weeks- going well--now 0.5mg./week- did get ill on first dose --second dose last Saturday --tolerates well now.   Eats proteins first but struggles with eating other sources of proteins other than meats --like greek yogurt.   Struggles to get exercise - feels new job end of August will help.     Previously :   Never tried any wt.loss meds.     Nutrition/Eating Habits: tried dieting in the past many times --nothing has ever worked.  Bfast- coffee and oat milk, cereal or yogurt or bagels.   Lunch ; small meals -pea pods, dried fruit , eggs   Dinner; cooks at home - tonight pasta with shrimp and garlic bread  Late night snacks --loves ice cream --1/2 cup after dinner 3 x week.      Exercise/Activity: sedentary   Medication History:  None.            Today's Vitals: /88   Pulse 93   Wt 238 lb 9.6 oz (108.2 kg)   SpO2 98%   BMI 42.95 kg/m    ----------------      I spent 15 minutes with this patient today. All changes were made via collaborative practice agreement with Dr. Vaishali Benedcit MD. A copy of the visit note was provided to the patient's provider(s).    A summary of these recommendations was given to the patient.    Aramis Espinoza [facility].  Medication Therapy Management Provider  [phone]         Medication Therapy Recommendations  No medication therapy recommendations to display           "

## 2025-07-14 ENCOUNTER — OFFICE VISIT (OUTPATIENT)
Dept: PHARMACY | Facility: CLINIC | Age: 30
End: 2025-07-14
Payer: COMMERCIAL

## 2025-07-14 VITALS
BODY MASS INDEX: 42.95 KG/M2 | HEART RATE: 93 BPM | SYSTOLIC BLOOD PRESSURE: 112 MMHG | WEIGHT: 238.6 LBS | OXYGEN SATURATION: 98 % | DIASTOLIC BLOOD PRESSURE: 88 MMHG

## 2025-07-14 DIAGNOSIS — E66.01 MORBID OBESITY (H): Primary | ICD-10-CM

## 2025-07-14 PROCEDURE — 3074F SYST BP LT 130 MM HG: CPT | Performed by: PHARMACIST

## 2025-07-14 PROCEDURE — 99606 MTMS BY PHARM EST 15 MIN: CPT | Performed by: PHARMACIST

## 2025-07-14 PROCEDURE — 3079F DIAST BP 80-89 MM HG: CPT | Performed by: PHARMACIST

## 2025-07-14 NOTE — Clinical Note
Keily-- renea to see you to establish care as her new pcp --she is down 51lbs on high dose wegovy and feels great --will continue as is and just have her f/up with you now.  -Aramis

## 2025-07-14 NOTE — PATIENT INSTRUCTIONS
"Recommendations from today's MTM visit:                                                         1. Clinic weight today = 238.6lbs ---down from 252.8lbs -- down for a total of 51lbs. Amazing !    Continue to eat proteins at meals, stay well hydrated --continue strength training -- Go to [employer] 2-3 x a week for 20-30 minutes. Use [employer] machines!    Stay on weekly Wegovy 2.4mg. dose !    A great monthly goal is to lose 2lbs.!        Follow-up: with your new primary care provider Keily Gross In September .     It was great speaking with you today.  I value your experience and would be very thankful for your time in providing feedback in our clinic survey. In the next few days, you may receive an email or text message from [employer] with a link to a survey related to your  clinical pharmacist.\"     To schedule another MTM appointment, please call the clinic directly or you may call the MTM scheduling line at [phone] or toll-free at [phone].     My Clinical Pharmacist's contact information:                                                      Please feel free to contact me with any questions or concerns you have.      Aramis Espinoza [facility].  Medication Therapy Management Provider  [phone]    "